# Patient Record
Sex: MALE | Race: WHITE
[De-identification: names, ages, dates, MRNs, and addresses within clinical notes are randomized per-mention and may not be internally consistent; named-entity substitution may affect disease eponyms.]

---

## 2018-09-08 ENCOUNTER — HOSPITAL ENCOUNTER (INPATIENT)
Dept: HOSPITAL 54 - ER | Age: 43
LOS: 11 days | Discharge: NURSING, REHAB OR LONG-TERM CARE | DRG: 710 | End: 2018-09-19
Attending: INTERNAL MEDICINE | Admitting: INTERNAL MEDICINE
Payer: MEDICAID

## 2018-09-08 VITALS — SYSTOLIC BLOOD PRESSURE: 112 MMHG | DIASTOLIC BLOOD PRESSURE: 64 MMHG

## 2018-09-08 VITALS — HEIGHT: 68 IN | BODY MASS INDEX: 27.74 KG/M2 | WEIGHT: 183 LBS

## 2018-09-08 DIAGNOSIS — J93.82: ICD-10-CM
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
## 2018-09-08 DIAGNOSIS — L89.810: ICD-10-CM

## 2018-09-08 DIAGNOSIS — G82.50: ICD-10-CM
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
## 2018-09-08 DIAGNOSIS — J98.11: ICD-10-CM

## 2018-09-08 DIAGNOSIS — T43.595A: ICD-10-CM
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
## 2018-09-08 DIAGNOSIS — F33.2: ICD-10-CM

## 2018-09-08 DIAGNOSIS — D64.9: ICD-10-CM

## 2018-09-08 DIAGNOSIS — F41.9: ICD-10-CM

## 2018-09-08 DIAGNOSIS — L89.153: ICD-10-CM

## 2018-09-08 DIAGNOSIS — J95.851: ICD-10-CM

## 2018-09-08 DIAGNOSIS — A41.59: Primary | ICD-10-CM

## 2018-09-08 DIAGNOSIS — J96.21: ICD-10-CM

## 2018-09-08 DIAGNOSIS — L89.154: ICD-10-CM

## 2018-09-08 DIAGNOSIS — F41.0: ICD-10-CM

## 2018-09-08 DIAGNOSIS — Z93.0: ICD-10-CM

## 2018-09-08 DIAGNOSIS — Z99.11: ICD-10-CM

## 2018-09-08 LAB
APTT PPP: 27 SEC (ref 23–34)
BASE EXCESS BLDA CALC-SCNC: 0.5 MMOL/L
BASOPHILS # BLD AUTO: 0 /CMM (ref 0–0.2)
BASOPHILS NFR BLD AUTO: 0.3 % (ref 0–2)
BUN SERPL-MCNC: 11 MG/DL (ref 7–18)
CALCIUM SERPL-MCNC: 8.3 MG/DL (ref 8.5–10.1)
CHLORIDE SERPL-SCNC: 100 MMOL/L (ref 98–107)
CO2 SERPL-SCNC: 27 MMOL/L (ref 21–32)
CREAT SERPL-MCNC: 0.5 MG/DL (ref 0.6–1.3)
D DIMER PPP FEU-MCNC: 0.97 MG/L(FEU (ref 0.17–0.5)
DO-HGB MFR BLDA: 572 MMHG
EOSINOPHIL NFR BLD AUTO: 0.7 % (ref 0–6)
GLUCOSE SERPL-MCNC: 117 MG/DL (ref 74–106)
HCT VFR BLD AUTO: 38 % (ref 39–51)
HGB BLD-MCNC: 11.9 G/DL (ref 13.5–17.5)
INHALED O2 CONCENTRATION: 100 %
INR PPP: 1.05 (ref 0.87–1.13)
LYMPHOCYTES NFR BLD AUTO: 1.3 /CMM (ref 0.8–4.8)
LYMPHOCYTES NFR BLD AUTO: 10.1 % (ref 20–44)
MCH RBC QN AUTO: 28 PG (ref 26–33)
MCHC RBC AUTO-ENTMCNC: 31 G/DL (ref 31–36)
MCV RBC AUTO: 88 FL (ref 80–96)
MONOCYTES NFR BLD AUTO: 0.6 /CMM (ref 0.1–1.3)
MONOCYTES NFR BLD AUTO: 4.8 % (ref 2–12)
NEUTROPHILS # BLD AUTO: 10.7 /CMM (ref 1.8–8.9)
NEUTROPHILS NFR BLD AUTO: 84.1 % (ref 43–81)
PCO2 TEMP ADJ BLDA: 33.5 MMHG (ref 35–45)
PH TEMP ADJ BLDA: 7.47 [PH] (ref 7.35–7.45)
PLATELET # BLD AUTO: 448 /CMM (ref 150–450)
PO2 TEMP ADJ BLDA: 107.5 MMHG (ref 75–100)
POTASSIUM SERPL-SCNC: 3.9 MMOL/L (ref 3.5–5.1)
RBC # BLD AUTO: 4.33 MIL/UL (ref 4.5–6)
RDW COEFFICIENT OF VARIATION: 14.6 (ref 11.5–15)
SAO2 % BLDA: 97.6 % (ref 92–98.5)
SODIUM SERPL-SCNC: 137 MMOL/L (ref 136–145)
TROPONIN I SERPL-MCNC: < 0.017 NG/ML (ref 0–0.06)
VENTILATION MODE VENT: (no result)
WBC NRBC COR # BLD AUTO: 12.8 K/UL (ref 4.3–11)

## 2018-09-08 PROCEDURE — A6402 STERILE GAUZE <= 16 SQ IN: HCPCS

## 2018-09-08 PROCEDURE — A4606 OXYGEN PROBE USED W OXIMETER: HCPCS

## 2018-09-08 PROCEDURE — A6253 ABSORPT DRG > 48 SQ IN W/O B: HCPCS

## 2018-09-08 PROCEDURE — Z7610: HCPCS

## 2018-09-08 PROCEDURE — A6403 STERILE GAUZE>16 <= 48 SQ IN: HCPCS

## 2018-09-08 PROCEDURE — L8501 TRACHEOSTOMY SPEAKING VALVE: HCPCS

## 2018-09-08 PROCEDURE — A6248 HYDROGEL DRSG GEL FILLER: HCPCS

## 2018-09-08 PROCEDURE — A4623 TRACHEOSTOMY INNER CANNULA: HCPCS

## 2018-09-08 PROCEDURE — A4216 STERILE WATER/SALINE, 10 ML: HCPCS

## 2018-09-08 PROCEDURE — A7526 TRACHEOSTOMY TUBE COLLAR: HCPCS

## 2018-09-08 NOTE — NUR
pt placed on vent via trach with charted settings. airway patent. trach secure via trach 
tie. pt alert. 

ambu bag at bedside. alarms set and audible, disconnect alarms checked plugged into red 
outlet

suctioned a small amount of thin white secretions. pt receiving no breathing tx at this 
time. pt hob at 30 degrees. suctioned oral secretion from pts mouth

pt currently has a cuffless suman 6 trach and has a large air leak. pt does not wish to 
have a cuffed trach at this time. vent resp rate and tidal volume is inaccurate at this 
time. abg done, er md and nurse notified.

-------------------------------------------------------------------------------

Addendum: 09/08/18 at 2345 by SOL THACKER RT

-------------------------------------------------------------------------------

Amended: Links added.

## 2018-09-08 NOTE — NUR
PT TO ER BED 3. BIBRA 39 C/O "LEAKING TRACH AND 02 AT 83%" PER RA MANUALLY 
BAGGING PT, NO SOB NOTED. PT ABLE TO VOICE CONCERNS. RT AT BEDSIDE FOR VENT.

## 2018-09-08 NOTE — NUR
pt received from fire rescue, pt ventilated via ambu bag through suman 6 trach cuffless 
spo2 85%. pt ventilated with ambu bag on 15L spo2 increased to 96%. pt placed on vent per md 
verbal order. pc 14 22/5 100%. pt tolerated settings at this time. abg to follow.

-------------------------------------------------------------------------------

Addendum: 09/08/18 at 2303 by SOL THACKER RT

-------------------------------------------------------------------------------

Amended: Links added.

## 2018-09-09 VITALS — DIASTOLIC BLOOD PRESSURE: 69 MMHG | SYSTOLIC BLOOD PRESSURE: 134 MMHG

## 2018-09-09 VITALS — SYSTOLIC BLOOD PRESSURE: 134 MMHG | DIASTOLIC BLOOD PRESSURE: 69 MMHG

## 2018-09-09 VITALS — SYSTOLIC BLOOD PRESSURE: 98 MMHG | DIASTOLIC BLOOD PRESSURE: 53 MMHG

## 2018-09-09 VITALS — SYSTOLIC BLOOD PRESSURE: 96 MMHG | DIASTOLIC BLOOD PRESSURE: 53 MMHG

## 2018-09-09 VITALS — SYSTOLIC BLOOD PRESSURE: 94 MMHG | DIASTOLIC BLOOD PRESSURE: 54 MMHG

## 2018-09-09 VITALS — DIASTOLIC BLOOD PRESSURE: 60 MMHG | SYSTOLIC BLOOD PRESSURE: 110 MMHG

## 2018-09-09 VITALS — DIASTOLIC BLOOD PRESSURE: 54 MMHG | SYSTOLIC BLOOD PRESSURE: 94 MMHG

## 2018-09-09 VITALS — SYSTOLIC BLOOD PRESSURE: 104 MMHG | DIASTOLIC BLOOD PRESSURE: 69 MMHG

## 2018-09-09 VITALS — DIASTOLIC BLOOD PRESSURE: 55 MMHG | SYSTOLIC BLOOD PRESSURE: 94 MMHG

## 2018-09-09 VITALS — SYSTOLIC BLOOD PRESSURE: 117 MMHG | DIASTOLIC BLOOD PRESSURE: 67 MMHG

## 2018-09-09 VITALS — SYSTOLIC BLOOD PRESSURE: 98 MMHG | DIASTOLIC BLOOD PRESSURE: 52 MMHG

## 2018-09-09 VITALS — DIASTOLIC BLOOD PRESSURE: 66 MMHG | SYSTOLIC BLOOD PRESSURE: 115 MMHG

## 2018-09-09 VITALS — DIASTOLIC BLOOD PRESSURE: 53 MMHG | SYSTOLIC BLOOD PRESSURE: 96 MMHG

## 2018-09-09 VITALS — SYSTOLIC BLOOD PRESSURE: 115 MMHG | DIASTOLIC BLOOD PRESSURE: 66 MMHG

## 2018-09-09 VITALS — DIASTOLIC BLOOD PRESSURE: 57 MMHG | SYSTOLIC BLOOD PRESSURE: 106 MMHG

## 2018-09-09 VITALS — DIASTOLIC BLOOD PRESSURE: 70 MMHG | SYSTOLIC BLOOD PRESSURE: 121 MMHG

## 2018-09-09 VITALS — DIASTOLIC BLOOD PRESSURE: 51 MMHG | SYSTOLIC BLOOD PRESSURE: 96 MMHG

## 2018-09-09 VITALS — SYSTOLIC BLOOD PRESSURE: 103 MMHG | DIASTOLIC BLOOD PRESSURE: 55 MMHG

## 2018-09-09 VITALS — DIASTOLIC BLOOD PRESSURE: 64 MMHG | SYSTOLIC BLOOD PRESSURE: 111 MMHG

## 2018-09-09 VITALS — DIASTOLIC BLOOD PRESSURE: 59 MMHG | SYSTOLIC BLOOD PRESSURE: 106 MMHG

## 2018-09-09 VITALS — SYSTOLIC BLOOD PRESSURE: 121 MMHG | DIASTOLIC BLOOD PRESSURE: 70 MMHG

## 2018-09-09 VITALS — DIASTOLIC BLOOD PRESSURE: 55 MMHG | SYSTOLIC BLOOD PRESSURE: 97 MMHG

## 2018-09-09 VITALS — DIASTOLIC BLOOD PRESSURE: 61 MMHG | SYSTOLIC BLOOD PRESSURE: 97 MMHG

## 2018-09-09 VITALS — DIASTOLIC BLOOD PRESSURE: 62 MMHG | SYSTOLIC BLOOD PRESSURE: 106 MMHG

## 2018-09-09 VITALS — SYSTOLIC BLOOD PRESSURE: 110 MMHG | DIASTOLIC BLOOD PRESSURE: 60 MMHG

## 2018-09-09 VITALS — DIASTOLIC BLOOD PRESSURE: 56 MMHG | SYSTOLIC BLOOD PRESSURE: 93 MMHG

## 2018-09-09 VITALS — DIASTOLIC BLOOD PRESSURE: 68 MMHG | SYSTOLIC BLOOD PRESSURE: 110 MMHG

## 2018-09-09 VITALS — SYSTOLIC BLOOD PRESSURE: 107 MMHG | DIASTOLIC BLOOD PRESSURE: 68 MMHG

## 2018-09-09 VITALS — DIASTOLIC BLOOD PRESSURE: 56 MMHG | SYSTOLIC BLOOD PRESSURE: 101 MMHG

## 2018-09-09 VITALS — SYSTOLIC BLOOD PRESSURE: 125 MMHG | DIASTOLIC BLOOD PRESSURE: 67 MMHG

## 2018-09-09 VITALS — SYSTOLIC BLOOD PRESSURE: 97 MMHG | DIASTOLIC BLOOD PRESSURE: 61 MMHG

## 2018-09-09 VITALS — SYSTOLIC BLOOD PRESSURE: 97 MMHG | DIASTOLIC BLOOD PRESSURE: 52 MMHG

## 2018-09-09 LAB
BASE EXCESS BLDA CALC-SCNC: 1.9 MMOL/L
BASOPHILS # BLD AUTO: 0.1 /CMM (ref 0–0.2)
BASOPHILS NFR BLD AUTO: 0.4 % (ref 0–2)
BUN SERPL-MCNC: 7 MG/DL (ref 7–18)
CALCIUM SERPL-MCNC: 8.5 MG/DL (ref 8.5–10.1)
CHLORIDE SERPL-SCNC: 103 MMOL/L (ref 98–107)
CO2 SERPL-SCNC: 24 MMOL/L (ref 21–32)
CREAT SERPL-MCNC: 0.4 MG/DL (ref 0.6–1.3)
DO-HGB MFR BLDA: 420.5 MMHG
EOSINOPHIL NFR BLD AUTO: 0.3 % (ref 0–6)
GLUCOSE SERPL-MCNC: 130 MG/DL (ref 74–106)
HCT VFR BLD AUTO: 35 % (ref 39–51)
HGB BLD-MCNC: 11.3 G/DL (ref 13.5–17.5)
INHALED O2 CONCENTRATION: 80 %
INTRINSIC PEEP RESPIRATORY: 5 CM H2O
LYMPHOCYTES NFR BLD AUTO: 0.7 /CMM (ref 0.8–4.8)
LYMPHOCYTES NFR BLD AUTO: 4.7 % (ref 20–44)
MCH RBC QN AUTO: 28 PG (ref 26–33)
MCHC RBC AUTO-ENTMCNC: 32 G/DL (ref 31–36)
MCV RBC AUTO: 87 FL (ref 80–96)
MONOCYTES NFR BLD AUTO: 0.9 /CMM (ref 0.1–1.3)
MONOCYTES NFR BLD AUTO: 6.3 % (ref 2–12)
NEUTROPHILS # BLD AUTO: 13.2 /CMM (ref 1.8–8.9)
NEUTROPHILS NFR BLD AUTO: 88.3 % (ref 43–81)
PCO2 TEMP ADJ BLDA: 45.2 MMHG (ref 35–45)
PH TEMP ADJ BLDA: 7.4 [PH] (ref 7.35–7.45)
PLATELET # BLD AUTO: 413 /CMM (ref 150–450)
PO2 TEMP ADJ BLDA: 102.4 MMHG (ref 75–100)
POTASSIUM SERPL-SCNC: 4.1 MMOL/L (ref 3.5–5.1)
RBC # BLD AUTO: 4.06 MIL/UL (ref 4.5–6)
RDW COEFFICIENT OF VARIATION: 14.8 (ref 11.5–15)
SAO2 % BLDA: 96.8 % (ref 92–98.5)
SODIUM SERPL-SCNC: 136 MMOL/L (ref 136–145)
VENTILATION MODE VENT: (no result)
WBC NRBC COR # BLD AUTO: 15 K/UL (ref 4.3–11)

## 2018-09-09 PROCEDURE — 5A1955Z RESPIRATORY VENTILATION, GREATER THAN 96 CONSECUTIVE HOURS: ICD-10-PCS | Performed by: INTERNAL MEDICINE

## 2018-09-09 RX ADMIN — PIPERACILLIN SODIUM AND TAZOBACTAM SODIUM SCH MLS/HR: .5; 4 INJECTION, POWDER, LYOPHILIZED, FOR SOLUTION INTRAVENOUS at 23:01

## 2018-09-09 RX ADMIN — ALBUTEROL SULFATE SCH MG: 2.5 SOLUTION RESPIRATORY (INHALATION) at 19:55

## 2018-09-09 RX ADMIN — QUETIAPINE SCH MG: 25 TABLET, FILM COATED ORAL at 09:09

## 2018-09-09 RX ADMIN — QUETIAPINE SCH MG: 25 TABLET, FILM COATED ORAL at 16:10

## 2018-09-09 RX ADMIN — TRAZODONE HYDROCHLORIDE SCH MG: 50 TABLET ORAL at 22:21

## 2018-09-09 RX ADMIN — TRAMADOL HYDROCHLORIDE PRN MG: 50 TABLET, FILM COATED ORAL at 21:20

## 2018-09-09 RX ADMIN — ALBUTEROL SULFATE SCH MG: 2.5 SOLUTION RESPIRATORY (INHALATION) at 13:01

## 2018-09-09 RX ADMIN — TRAMADOL HYDROCHLORIDE PRN MG: 50 TABLET, FILM COATED ORAL at 16:08

## 2018-09-09 RX ADMIN — DEXTROSE MONOHYDRATE SCH MLS/HR: 50 INJECTION, SOLUTION INTRAVENOUS at 08:18

## 2018-09-09 RX ADMIN — QUETIAPINE PRN MG: 25 TABLET, FILM COATED ORAL at 13:06

## 2018-09-09 RX ADMIN — BUDESONIDE SCH MG: 0.25 SUSPENSION RESPIRATORY (INHALATION) at 09:00

## 2018-09-09 RX ADMIN — PIPERACILLIN SODIUM AND TAZOBACTAM SODIUM SCH MLS/HR: .5; 4 INJECTION, POWDER, LYOPHILIZED, FOR SOLUTION INTRAVENOUS at 17:48

## 2018-09-09 RX ADMIN — ACETYLCYSTEINE SCH MG: 100 INHALANT RESPIRATORY (INHALATION) at 09:00

## 2018-09-09 RX ADMIN — DEXTROSE MONOHYDRATE SCH MLS/HR: 50 INJECTION, SOLUTION INTRAVENOUS at 16:09

## 2018-09-09 RX ADMIN — SODIUM CHLORIDE, SODIUM LACTATE, POTASSIUM CHLORIDE, AND CALCIUM CHLORIDE PRN MLS/HR: .6; .31; .03; .02 INJECTION, SOLUTION INTRAVENOUS at 09:03

## 2018-09-09 RX ADMIN — CLOTRIMAZOLE SCH GM: 1 CREAM TOPICAL at 12:45

## 2018-09-09 RX ADMIN — SODIUM CHLORIDE, SODIUM LACTATE, POTASSIUM CHLORIDE, AND CALCIUM CHLORIDE PRN MLS/HR: .6; .31; .03; .02 INJECTION, SOLUTION INTRAVENOUS at 22:23

## 2018-09-09 RX ADMIN — ACETYLCYSTEINE SCH MG: 100 INHALANT RESPIRATORY (INHALATION) at 21:18

## 2018-09-09 RX ADMIN — OXYCODONE HYDROCHLORIDE AND ACETAMINOPHEN SCH MG: 500 TABLET ORAL at 16:10

## 2018-09-09 RX ADMIN — PIPERACILLIN SODIUM AND TAZOBACTAM SODIUM SCH MLS/HR: .5; 4 INJECTION, POWDER, LYOPHILIZED, FOR SOLUTION INTRAVENOUS at 11:00

## 2018-09-09 RX ADMIN — Medication SCH MG: at 09:09

## 2018-09-09 RX ADMIN — OXYCODONE HYDROCHLORIDE AND ACETAMINOPHEN SCH MG: 500 TABLET ORAL at 09:09

## 2018-09-09 RX ADMIN — LIDOCAINE HYDROCHLORIDE PRN ML: 20 SOLUTION ORAL; TOPICAL at 18:17

## 2018-09-09 RX ADMIN — DEXTROSE MONOHYDRATE SCH MLS/HR: 50 INJECTION, SOLUTION INTRAVENOUS at 23:35

## 2018-09-09 RX ADMIN — BUDESONIDE SCH MG: 0.25 SUSPENSION RESPIRATORY (INHALATION) at 21:18

## 2018-09-09 RX ADMIN — CLOTRIMAZOLE SCH GM: 1 CREAM TOPICAL at 17:00

## 2018-09-09 NOTE — NUR
vent changes made per md verbal order and according to pt comfort

-------------------------------------------------------------------------------

Addendum: 09/09/18 at 4861 by SOL MARROQUIN

-------------------------------------------------------------------------------

Amended: Links added.

## 2018-09-09 NOTE — NUR
ICU RN INITIAL SHIFT NOTES



RECEIVED PATIENT IN BED, AWAKE, ALERT AND ORIENTED X4. TRACH MIDLINE AND INTACT, SHILEY 6 
CUFFLESS, SETTINGS: PC 16, FIO2 @ 60%, PEEP 5. PATIENT TOLERATING SETTINGS FAIRLY, WILL 
MONITOR RESPIRATORY STATUS. ON TELEMETRY MONITORING, READING SR, HR 60s AT THIS TIME. 
C-COLLAR OFF AT THIS TIME, TO BE PLACED BACK ON @ 2100. MASON CATHETER DRAINGING URINE VIA 
GRAVITY. IV SITES PATENT AND INTACT, FLUSHED WITH NS, FREE FROM ANY S/S OF INFILTRATION OR 
PHLEBITIS. PLAN OF CARE DISCUSSED IN DETAIL WITH PATIENT AND FAMILY MEMBERS/VISITORS, WITH 
VERBALIZATION OF UNDERSTANDING. WILL MONITOR CLOSELY

## 2018-09-09 NOTE — NUR
18G IV TO L HAND X 1 ATTEMPT USING ASEPTIC TECH. IV FLUSHES EASILY WITH NS, NO 
S/S INFILTRATION NOTED AT THIS TIME.

## 2018-09-09 NOTE — NUR
VSS. ALL DUE MEDS GIVEN AND ALL NEEDS MET. PATIENT STATES LIDOCAINE SWISH HELPED WITH THROAT 
AND ULTRAM WITH PAIN. IVF RUNNING PER ORDER IV SITES C/D/I/P. PATIENT EATING WITHOUT S/S 
ASPIRATION AND TOLERATING DIET WELL. BREATHING STABLE ON 60% FI02. SAFETY, ASPIRATION, AND 
SKIN PRECAUTIONS IN PLACE. WILL ENDORSE TO RN FOR RENATA. PATIENT STABLE AT THIS TIME.

## 2018-09-09 NOTE — NUR
RECEIVED PATIENT FROM RN GALEN. PATIENT SLEEPING AWAKE TO LIGHT TOUCH. PATIENT DENIES SOB, 
DIFFICULTY BREATHING AT THIS TIME. ARRIVED TO HOSPITAL S/T HYPOXIA % FI02. PATIENT 
CURRENTLY TOLERATING 80% FI02 OXYGENATION STABLE. PATIENT TRACH VENT ON PRESSURE SUPPORT PER 
ORDER. SARAHI #6. A/OX4. TELE NSR. MASON CATH IN PLACE DRAINING TO GRAVITY. PATIENT WITH C 
COLLAR ON WHICH IS TO STAY IN PLACE FOR SUPPORT. IV SITES C/D/I/P. ALL NEEDS ASSESSED AND 
MET. ASPIRATION, SAFETY, AND SKIN PRECAUTIONS IN PLACE. WILL MONITOR

## 2018-09-09 NOTE — NUR
PER PATIENT HE TAKES SEROQUEL AT HOME PRN AS WELL AS LEXUS. NOTIFIED MD SALEH. PER MD ORDER 
12.5 MG SEROQUEL PRN Q8H

## 2018-09-09 NOTE — NUR
PER PATIENT THROAT SPRAY NOT WORKING. OK PER MD TO ORDER VISCOUS LIDOCAINE 15ML Q8H PRN FOR 
SORE THROAT

## 2018-09-09 NOTE — NUR
RECEIVED PT TRACHED, CUFFLESS ON VENT. PT IS AWAKE AND ALERT NO SOB. PT IS ON PC MODE. 
ALARMS SET AND AUDIBLE. AMBU BAG AT BEDSIDE. VENT PLUGGED INTO RED OUTLET. WILL CONTINUE TO 
MONITOR.

-------------------------------------------------------------------------------

Addendum: 09/09/18 at 2018 by DEBORAH LERMA RT

-------------------------------------------------------------------------------

Amended: Links added.

## 2018-09-09 NOTE — NUR
ICU RN CLOSING NOTES



PATIENT RESTING IN BED, APPEARS COMFORTABLE, DENIES ANY PAIN OR DISCOMFORT. REMAINS ON 
MECHANICAL VENTILATOR. WILL ENDORSE THE PATIENT TO THE AM SHIFT NURSE FOR CONTINUITY OF CARE

## 2018-09-09 NOTE — NUR
DR SALEH AT BEDSIDE. PER MD PERKINS ORDER CHLORASEPTIC SPRAY FOR PATIENT SORE THROAT. MD AWARE OF 
MED RECON. PATIENT STABLE AT THIS TIME

## 2018-09-09 NOTE — NUR
ICU RN ADMISSION NOTES



RECEIVED PATIENT FROM ER VIA [facility]. PATIENT TRANSFERRED TO HOSPITAL BED IN ICU ROOM 256. 
PATIENT TOLERATED TRANSFER WELL. PATIENT'S FATHER AT BEDSIDE UPON ADMISSION. PLAN OF CARE 
DISCUSSED WITH THE PATIENT AND HIS FATHER, BOTH VERBALIZING UNDERSTANDING OF THE PLAN AND IN 
AGREEMENT. PATIENT IS ALERT AND ORIENTED X4, ABLE TO VERBALIZE NEEDS, BUT DUE TO RECENT 
SWIMMING ACCIDENT, IS NOW QUADRIPLEGIC. NOTED WITH SPINAL COLLAR, ADJUSTED FOR COMFORT. 
SARAHI STEIN 6, CUFFLESS. EXPLAINED TO THE PATIENT AND PATIENT'S FATHER REGARDING THE NEED 
FOR A TRACH WITH CUFF FOR SAFETY/AIRWAY PROTECTION. PATIENT AND PATIENT'S FATHER BOTH 
VERBALIZE UNDERSTANDING, BUT STILL STRONGLY REFUSE BECAUSE THE PATIENT "WILL NO LONG BE ABLE 
TO EAT OR TALK." PATIENT REMAINS ON MECHANICAL VENTILATOR, PRESSURE CONTROL OF 16, FIO2 @ 
100% WITH PEEP 5. PATIENT AND FAMILY INSTRUCTED TO WAIT FOR AND SPEAK TO THE PULMONOLOGIST 
TO DISCUSS TREATMENT OPTIONS, BOTH PATIENT AND FATHER IN AGREEMENT OF THIS PLAN. LEFT HAND 

#18 AND RIGHT HAND #18 PERIPHERAL IVs PATENT AND INTACT, BOTH FLUSHED WITH NS, FREE FROM ANY 
S/S OF INFILTRATION OR PHLEBITIS. MASON CATHETER PATENT AND INTACT, DRAINING CLEAR YELLOW 
URINE TO GRAVITY. BED IN LOWEST AND LOCKED POSITION. WILL CONTINUE TO CLOSELY MONITOR

## 2018-09-09 NOTE — NUR
DR FONTAINE AT BEDSIDE. PER MD OK TITRATE FI02 AND REDUCE TO 60%. PER MD NO NEED FOR CHANGING 
OF TRACH. OK AS IS. NO S/S LEAKING AT THIS TIME

## 2018-09-10 VITALS — DIASTOLIC BLOOD PRESSURE: 56 MMHG | SYSTOLIC BLOOD PRESSURE: 88 MMHG

## 2018-09-10 VITALS — SYSTOLIC BLOOD PRESSURE: 92 MMHG | DIASTOLIC BLOOD PRESSURE: 55 MMHG

## 2018-09-10 VITALS — SYSTOLIC BLOOD PRESSURE: 107 MMHG | DIASTOLIC BLOOD PRESSURE: 64 MMHG

## 2018-09-10 VITALS — SYSTOLIC BLOOD PRESSURE: 119 MMHG | DIASTOLIC BLOOD PRESSURE: 75 MMHG

## 2018-09-10 VITALS — DIASTOLIC BLOOD PRESSURE: 55 MMHG | SYSTOLIC BLOOD PRESSURE: 95 MMHG

## 2018-09-10 VITALS — DIASTOLIC BLOOD PRESSURE: 62 MMHG | SYSTOLIC BLOOD PRESSURE: 102 MMHG

## 2018-09-10 VITALS — SYSTOLIC BLOOD PRESSURE: 97 MMHG | DIASTOLIC BLOOD PRESSURE: 51 MMHG

## 2018-09-10 VITALS — SYSTOLIC BLOOD PRESSURE: 98 MMHG | DIASTOLIC BLOOD PRESSURE: 60 MMHG

## 2018-09-10 VITALS — SYSTOLIC BLOOD PRESSURE: 92 MMHG | DIASTOLIC BLOOD PRESSURE: 49 MMHG

## 2018-09-10 VITALS — SYSTOLIC BLOOD PRESSURE: 98 MMHG | DIASTOLIC BLOOD PRESSURE: 57 MMHG

## 2018-09-10 VITALS — SYSTOLIC BLOOD PRESSURE: 106 MMHG | DIASTOLIC BLOOD PRESSURE: 64 MMHG

## 2018-09-10 VITALS — SYSTOLIC BLOOD PRESSURE: 114 MMHG | DIASTOLIC BLOOD PRESSURE: 65 MMHG

## 2018-09-10 VITALS — DIASTOLIC BLOOD PRESSURE: 51 MMHG | SYSTOLIC BLOOD PRESSURE: 97 MMHG

## 2018-09-10 VITALS — DIASTOLIC BLOOD PRESSURE: 61 MMHG | SYSTOLIC BLOOD PRESSURE: 105 MMHG

## 2018-09-10 VITALS — DIASTOLIC BLOOD PRESSURE: 64 MMHG | SYSTOLIC BLOOD PRESSURE: 109 MMHG

## 2018-09-10 VITALS — DIASTOLIC BLOOD PRESSURE: 66 MMHG | SYSTOLIC BLOOD PRESSURE: 109 MMHG

## 2018-09-10 VITALS — DIASTOLIC BLOOD PRESSURE: 60 MMHG | SYSTOLIC BLOOD PRESSURE: 98 MMHG

## 2018-09-10 VITALS — SYSTOLIC BLOOD PRESSURE: 101 MMHG | DIASTOLIC BLOOD PRESSURE: 55 MMHG

## 2018-09-10 VITALS — SYSTOLIC BLOOD PRESSURE: 109 MMHG | DIASTOLIC BLOOD PRESSURE: 64 MMHG

## 2018-09-10 VITALS — DIASTOLIC BLOOD PRESSURE: 76 MMHG | SYSTOLIC BLOOD PRESSURE: 129 MMHG

## 2018-09-10 VITALS — DIASTOLIC BLOOD PRESSURE: 59 MMHG | SYSTOLIC BLOOD PRESSURE: 102 MMHG

## 2018-09-10 VITALS — DIASTOLIC BLOOD PRESSURE: 55 MMHG | SYSTOLIC BLOOD PRESSURE: 107 MMHG

## 2018-09-10 VITALS — DIASTOLIC BLOOD PRESSURE: 62 MMHG | SYSTOLIC BLOOD PRESSURE: 104 MMHG

## 2018-09-10 VITALS — DIASTOLIC BLOOD PRESSURE: 62 MMHG | SYSTOLIC BLOOD PRESSURE: 106 MMHG

## 2018-09-10 VITALS — DIASTOLIC BLOOD PRESSURE: 55 MMHG | SYSTOLIC BLOOD PRESSURE: 101 MMHG

## 2018-09-10 VITALS — SYSTOLIC BLOOD PRESSURE: 105 MMHG | DIASTOLIC BLOOD PRESSURE: 62 MMHG

## 2018-09-10 VITALS — SYSTOLIC BLOOD PRESSURE: 103 MMHG | DIASTOLIC BLOOD PRESSURE: 58 MMHG

## 2018-09-10 VITALS — SYSTOLIC BLOOD PRESSURE: 95 MMHG | DIASTOLIC BLOOD PRESSURE: 56 MMHG

## 2018-09-10 VITALS — SYSTOLIC BLOOD PRESSURE: 105 MMHG | DIASTOLIC BLOOD PRESSURE: 56 MMHG

## 2018-09-10 LAB
BASE EXCESS BLDA CALC-SCNC: 2.2 MMOL/L
BASE EXCESS BLDA CALC-SCNC: 2.5 MMOL/L
BASOPHILS # BLD AUTO: 0 /CMM (ref 0–0.2)
BASOPHILS NFR BLD AUTO: 0.1 % (ref 0–2)
BUN SERPL-MCNC: 4 MG/DL (ref 7–18)
CALCIUM SERPL-MCNC: 8.2 MG/DL (ref 8.5–10.1)
CHLORIDE SERPL-SCNC: 100 MMOL/L (ref 98–107)
CO2 SERPL-SCNC: 27 MMOL/L (ref 21–32)
CREAT SERPL-MCNC: 0.2 MG/DL (ref 0.6–1.3)
DO-HGB MFR BLDA: 416.3 MMHG
DO-HGB MFR BLDA: 600 MMHG
EOSINOPHIL NFR BLD AUTO: 0.8 % (ref 0–6)
GLUCOSE SERPL-MCNC: 134 MG/DL (ref 74–106)
HCT VFR BLD AUTO: 35 % (ref 39–51)
HGB BLD-MCNC: 10.7 G/DL (ref 13.5–17.5)
INHALED O2 CONCENTRATION: 100 %
INHALED O2 CONCENTRATION: 80 %
INTRINSIC PEEP RESPIRATORY: 0 CM H2O
LYMPHOCYTES NFR BLD AUTO: 0.5 /CMM (ref 0.8–4.8)
LYMPHOCYTES NFR BLD AUTO: 2.7 % (ref 20–44)
MCH RBC QN AUTO: 27 PG (ref 26–33)
MCHC RBC AUTO-ENTMCNC: 31 G/DL (ref 31–36)
MCV RBC AUTO: 88 FL (ref 80–96)
MONOCYTES NFR BLD AUTO: 1.1 /CMM (ref 0.1–1.3)
MONOCYTES NFR BLD AUTO: 5.8 % (ref 2–12)
NEUTROPHILS # BLD AUTO: 16.9 /CMM (ref 1.8–8.9)
NEUTROPHILS NFR BLD AUTO: 90.6 % (ref 43–81)
PCO2 TEMP ADJ BLDA: 53.7 MMHG (ref 35–45)
PCO2 TEMP ADJ BLDA: 58.3 MMHG (ref 35–45)
PEEP SETTING VENT: 500 ML
PEEP SETTING VENT: 500 ML
PH TEMP ADJ BLDA: 7.32 [PH] (ref 7.35–7.45)
PH TEMP ADJ BLDA: 7.35 [PH] (ref 7.35–7.45)
PLATELET # BLD AUTO: 347 /CMM (ref 150–450)
PO2 TEMP ADJ BLDA: 59.3 MMHG (ref 75–100)
PO2 TEMP ADJ BLDA: 92.9 MMHG (ref 75–100)
POTASSIUM SERPL-SCNC: 4.1 MMOL/L (ref 3.5–5.1)
RBC # BLD AUTO: 3.97 MIL/UL (ref 4.5–6)
RDW COEFFICIENT OF VARIATION: 14.4 (ref 11.5–15)
SAO2 % BLDA: 89.4 % (ref 92–98.5)
SAO2 % BLDA: 96 % (ref 92–98.5)
SET RATE, BG: 14
SET RATE, BG: 16
SODIUM SERPL-SCNC: 132 MMOL/L (ref 136–145)
VENTILATION MODE VENT: (no result)
WBC NRBC COR # BLD AUTO: 18.7 K/UL (ref 4.3–11)

## 2018-09-10 RX ADMIN — OXYCODONE HYDROCHLORIDE AND ACETAMINOPHEN SCH MG: 500 TABLET ORAL at 17:09

## 2018-09-10 RX ADMIN — QUETIAPINE PRN MG: 25 TABLET, FILM COATED ORAL at 21:13

## 2018-09-10 RX ADMIN — TRAMADOL HYDROCHLORIDE PRN MG: 50 TABLET, FILM COATED ORAL at 19:08

## 2018-09-10 RX ADMIN — TRAZODONE HYDROCHLORIDE SCH MG: 50 TABLET ORAL at 21:13

## 2018-09-10 RX ADMIN — SODIUM CHLORIDE, SODIUM LACTATE, POTASSIUM CHLORIDE, AND CALCIUM CHLORIDE PRN MLS/HR: .6; .31; .03; .02 INJECTION, SOLUTION INTRAVENOUS at 12:11

## 2018-09-10 RX ADMIN — PIPERACILLIN SODIUM AND TAZOBACTAM SODIUM SCH MLS/HR: .5; 4 INJECTION, POWDER, LYOPHILIZED, FOR SOLUTION INTRAVENOUS at 17:09

## 2018-09-10 RX ADMIN — QUETIAPINE PRN MG: 25 TABLET, FILM COATED ORAL at 11:19

## 2018-09-10 RX ADMIN — PIPERACILLIN SODIUM AND TAZOBACTAM SODIUM SCH MLS/HR: .5; 4 INJECTION, POWDER, LYOPHILIZED, FOR SOLUTION INTRAVENOUS at 11:19

## 2018-09-10 RX ADMIN — ACETYLCYSTEINE SCH MG: 100 INHALANT RESPIRATORY (INHALATION) at 07:35

## 2018-09-10 RX ADMIN — PIPERACILLIN SODIUM AND TAZOBACTAM SODIUM SCH MLS/HR: .5; 4 INJECTION, POWDER, LYOPHILIZED, FOR SOLUTION INTRAVENOUS at 06:46

## 2018-09-10 RX ADMIN — ALBUTEROL SULFATE SCH MG: 2.5 SOLUTION RESPIRATORY (INHALATION) at 01:07

## 2018-09-10 RX ADMIN — BUDESONIDE SCH MG: 0.25 SUSPENSION RESPIRATORY (INHALATION) at 21:44

## 2018-09-10 RX ADMIN — ALBUTEROL SULFATE SCH MG: 2.5 SOLUTION RESPIRATORY (INHALATION) at 07:35

## 2018-09-10 RX ADMIN — LIDOCAINE HYDROCHLORIDE PRN ML: 20 SOLUTION ORAL; TOPICAL at 12:26

## 2018-09-10 RX ADMIN — Medication SCH EACH: at 17:09

## 2018-09-10 RX ADMIN — TRAMADOL HYDROCHLORIDE PRN MG: 50 TABLET, FILM COATED ORAL at 07:36

## 2018-09-10 RX ADMIN — DEXTROSE MONOHYDRATE SCH MLS/HR: 50 INJECTION, SOLUTION INTRAVENOUS at 08:47

## 2018-09-10 RX ADMIN — ALBUTEROL SULFATE SCH MG: 2.5 SOLUTION RESPIRATORY (INHALATION) at 20:00

## 2018-09-10 RX ADMIN — CLOTRIMAZOLE SCH GM: 1 CREAM TOPICAL at 09:00

## 2018-09-10 RX ADMIN — BUDESONIDE SCH MG: 0.25 SUSPENSION RESPIRATORY (INHALATION) at 09:00

## 2018-09-10 RX ADMIN — OXYCODONE HYDROCHLORIDE AND ACETAMINOPHEN SCH MG: 500 TABLET ORAL at 08:01

## 2018-09-10 RX ADMIN — Medication SCH APPLIC: at 11:19

## 2018-09-10 RX ADMIN — TRAMADOL HYDROCHLORIDE PRN MG: 50 TABLET, FILM COATED ORAL at 02:07

## 2018-09-10 RX ADMIN — QUETIAPINE SCH MG: 25 TABLET, FILM COATED ORAL at 17:09

## 2018-09-10 RX ADMIN — ACETYLCYSTEINE SCH MG: 100 INHALANT RESPIRATORY (INHALATION) at 21:44

## 2018-09-10 RX ADMIN — QUETIAPINE SCH MG: 25 TABLET, FILM COATED ORAL at 08:01

## 2018-09-10 RX ADMIN — TRAMADOL HYDROCHLORIDE PRN MG: 50 TABLET, FILM COATED ORAL at 22:48

## 2018-09-10 RX ADMIN — Medication SCH MG: at 08:01

## 2018-09-10 RX ADMIN — LIDOCAINE HYDROCHLORIDE PRN ML: 20 SOLUTION ORAL; TOPICAL at 02:07

## 2018-09-10 RX ADMIN — DEXTROSE MONOHYDRATE SCH MLS/HR: 50 INJECTION, SOLUTION INTRAVENOUS at 07:31

## 2018-09-10 RX ADMIN — DEXTROSE MONOHYDRATE SCH MLS/HR: 50 INJECTION, SOLUTION INTRAVENOUS at 15:22

## 2018-09-10 RX ADMIN — ALBUTEROL SULFATE SCH MG: 2.5 SOLUTION RESPIRATORY (INHALATION) at 13:32

## 2018-09-10 NOTE — NUR
PER DR FONTAINE OK TO CHANGE PATIENT VENT SETTINGS TO AC 14, , FI02 100% AND TITRATE FOR 
O2 SAT 94% OR GREATER. NO PEEP.AND ABG 2HRS POST CHANGES. PATIENT TOLERATING WELL AT THIS 
TIME. RT AT BEDSIDE. RR STABLE

## 2018-09-10 NOTE — NUR
pt received on vent via trach with charted settings. airway patent. secure via trach tie.

ambu bag at bedside. alarms set and audible, disconnect alarms checked plugged into red 
outlet.

suctioned a small amount of thin white secretions. pt receiving breathing tx q6. pt hob at 
30 degrees. suctioned oral secretion from pts mouth

-------------------------------------------------------------------------------

Addendum: 09/10/18 at 2007 by SOL MARROQUIN

-------------------------------------------------------------------------------

Amended: Links added.

## 2018-09-10 NOTE — NUR
PATIENT O2 SAT STABLE ON AC SETTINGS. PATIENT NEEDS ASSESSED AND MET. SAFETY, SKIN, 
ASPIRATION PRECAUTIONS MONITORED THROUGHOUT DAY. PATIENT IV SITES C/D/I/P. IVF RUNNING PER 
ORDER. MASON IN PLACE DRAINING TO GRAVITY. PATIENT CARE ENDORSED TO JOVON LIGHT FOR RENATA

## 2018-09-10 NOTE — NUR
DR SALEH AT BEDSIDE. UPDATED ON PATIENT INCREASED FI02 NEEDS. LEAKING AROUND TRACH PER MD 
CONTINUE IN ICU AND PENDING PULMONARY TODAY FOR ORDERS. MD AWARE OF PATIENT TEMPERATURE 94.2 
NO NEW ORDERS. MD AWARE OF PATIENT BRADYCARDIA AT TIMES TO HIGH 40'S NO NEW ORDERS. PT IV 
LEFT FA LEAKING. REMOVED AND CATH TIP INTACT. PRESSURE AND DRESSING APPLIED NO BLEEDING 
NOTED.

## 2018-09-10 NOTE — NUR
ICU RN - PT. IS DESATTING. RT AT BEDSIDE, THEY TURNED FIO2 UP %. PT. SLOWLY CAME UP 
FROM 80'S TO LOW 90'S. NO S/S OF DISTRESS/DISCOMFORT. CONT. POC.

## 2018-09-10 NOTE — NUR
NOTIFIED RT PATIENT FI02 NEEDS INCREASED OVERNIGHT AND PATIENT TACHYPNEIC. PER VENT PATIENT 
IS NOT GETTING VOLUME BECAUSE CONTINUES TO OPEN MOUTH AND AIR ESCAPES. EDUCATED PATIENT ON 
CUFFED TRACH AND RT AT BEDSIDE FOR EDUCATION AND ASSISTANCE

## 2018-09-10 NOTE — NUR
PT. 43 Y OLD REC. AWAKE AND ALERT 0700 AM TRACHED CUFFLESS SHILEY # 6 , ON MECHANICAL VENT ( 
PC MODE), @ 1240 PM 

X2 RT AT THE BEDSIDE. AND TRACH CHANGED ( MLT, B/S EQUAL, OBTURATOR REMAIN @ BEDSIDE.  ).

PER DR. FONTAINE ORDER AND NO DISTRESS NOTED, NO BLEEDING, GILLES. WELL AND 

VENT SETTING CHANGED PER DR. FONTAINE ORDER POST ABG VENT CHANGES DONE AGAIN PER DR. FONTAINE ( JOVON NARVAEZ NOTIFIED ALL THE CHANGES ) T/O  DAY FIO2 DECREASED FROM 100% TO 45 % AT THE END OF 
THE SHIFT. FAMILY MEMBERS AND FRIENDS AT THE BEDSIDE. AND PT. REMAIN STABLE. PT CONTINUE 
CARE AND MONITORING WAS CLOSELY DONE T/O SHIFT. TX'S GIVEN INLINE B/S REMAIN CLEAR AND FINE 
RALES BILATERALLY EQUAL CHEST RISE NOTED AND VOLUMES ON VENT REMAIN ADEQUATE, AND ONE MED 
WAS NOT GIVEN DUE TO REFUSAL. AMBU BAG REMAIN AT THE BEDSIDE. EDUCATION AND PT. WAS INFORMED 
FOR ALL THE CARE T/O DAY. 

-------------------------------------------------------------------------------

Addendum: 09/11/18 at 0734 by ROMÁN GRIMES RT

-------------------------------------------------------------------------------

Amended: Links added.

## 2018-09-10 NOTE — NUR
NOTIFIED DR FONTAINE PATIENT ABG. PER MD CHANGE AC TO 16 AND FI02 TO 60% NOTIFIED RT. PATIENT 
CONTINUES TO HAVE SEVERE ANXIETY PER MD ORDER ZYPREZA 5MG PO Q12H PRN. ALL NEEDS MET AND 
ASSESSED

## 2018-09-10 NOTE — NUR
PER PATIENT HE DOES NOT USE LOTRIMIN ANYMORE. HE USED TO USE IT ON THE FEET. PATIENT 
TOLERATED TRACH CHANGE WELL. NO COMPLICATIONS NOTED. TOLERATING WELL AT THIS TIME. WILL 
CONTINUE TO MONITOR

## 2018-09-10 NOTE — NUR
DR FONTAINE AT BEDSIDE. MD AWARE PATIENT TACHYPNEIC AND VOLUMES NOT BEING DELIVERED TO PATIENT/ 
TRACH LEAKING/AIR LEAKING. PER MD PATIENT IS NEEDING A CUFFED TRACH TO DELIVER OXYGEN TO 
LUNGS. PER MD TITRATE FI02 FOR OXYGEN SAT 94% AND ABOVE. PATIENT IS AGREEABLE TO TRACH 
CHANGE.

## 2018-09-10 NOTE — NUR
TRACHED CHANGED PER  TO SARAHI # 6 CUFFED X2 RT'S AT THE BEDSIDE. NO BLEEDING NOTED 
GOOD VOLUMES AND VENT CHANGES PER DR. FONTAINE PLACED ON AC 14, 500,100% BLOOD GAS ORDERED. 
AMBU BAG REMAIN AT THE BEDSIDE.

-------------------------------------------------------------------------------

Addendum: 09/10/18 at 1829 by ROMÁN GRIMES RT

-------------------------------------------------------------------------------

Amended: Links added.

## 2018-09-11 VITALS — DIASTOLIC BLOOD PRESSURE: 63 MMHG | SYSTOLIC BLOOD PRESSURE: 113 MMHG

## 2018-09-11 VITALS — DIASTOLIC BLOOD PRESSURE: 56 MMHG | SYSTOLIC BLOOD PRESSURE: 102 MMHG

## 2018-09-11 VITALS — SYSTOLIC BLOOD PRESSURE: 101 MMHG | DIASTOLIC BLOOD PRESSURE: 52 MMHG

## 2018-09-11 VITALS — SYSTOLIC BLOOD PRESSURE: 106 MMHG | DIASTOLIC BLOOD PRESSURE: 46 MMHG

## 2018-09-11 VITALS — DIASTOLIC BLOOD PRESSURE: 64 MMHG | SYSTOLIC BLOOD PRESSURE: 113 MMHG

## 2018-09-11 VITALS — DIASTOLIC BLOOD PRESSURE: 50 MMHG | SYSTOLIC BLOOD PRESSURE: 101 MMHG

## 2018-09-11 VITALS — DIASTOLIC BLOOD PRESSURE: 46 MMHG | SYSTOLIC BLOOD PRESSURE: 106 MMHG

## 2018-09-11 VITALS — DIASTOLIC BLOOD PRESSURE: 58 MMHG | SYSTOLIC BLOOD PRESSURE: 105 MMHG

## 2018-09-11 VITALS — SYSTOLIC BLOOD PRESSURE: 106 MMHG | DIASTOLIC BLOOD PRESSURE: 64 MMHG

## 2018-09-11 VITALS — SYSTOLIC BLOOD PRESSURE: 105 MMHG | DIASTOLIC BLOOD PRESSURE: 58 MMHG

## 2018-09-11 VITALS — DIASTOLIC BLOOD PRESSURE: 61 MMHG | SYSTOLIC BLOOD PRESSURE: 104 MMHG

## 2018-09-11 VITALS — SYSTOLIC BLOOD PRESSURE: 107 MMHG | DIASTOLIC BLOOD PRESSURE: 57 MMHG

## 2018-09-11 VITALS — DIASTOLIC BLOOD PRESSURE: 53 MMHG | SYSTOLIC BLOOD PRESSURE: 100 MMHG

## 2018-09-11 VITALS — SYSTOLIC BLOOD PRESSURE: 93 MMHG | DIASTOLIC BLOOD PRESSURE: 69 MMHG

## 2018-09-11 VITALS — SYSTOLIC BLOOD PRESSURE: 104 MMHG | DIASTOLIC BLOOD PRESSURE: 65 MMHG

## 2018-09-11 VITALS — SYSTOLIC BLOOD PRESSURE: 100 MMHG | DIASTOLIC BLOOD PRESSURE: 50 MMHG

## 2018-09-11 VITALS — DIASTOLIC BLOOD PRESSURE: 66 MMHG | SYSTOLIC BLOOD PRESSURE: 116 MMHG

## 2018-09-11 VITALS — DIASTOLIC BLOOD PRESSURE: 59 MMHG | SYSTOLIC BLOOD PRESSURE: 110 MMHG

## 2018-09-11 VITALS — DIASTOLIC BLOOD PRESSURE: 65 MMHG | SYSTOLIC BLOOD PRESSURE: 117 MMHG

## 2018-09-11 VITALS — SYSTOLIC BLOOD PRESSURE: 100 MMHG | DIASTOLIC BLOOD PRESSURE: 58 MMHG

## 2018-09-11 VITALS — SYSTOLIC BLOOD PRESSURE: 107 MMHG | DIASTOLIC BLOOD PRESSURE: 67 MMHG

## 2018-09-11 VITALS — DIASTOLIC BLOOD PRESSURE: 49 MMHG | SYSTOLIC BLOOD PRESSURE: 93 MMHG

## 2018-09-11 VITALS — SYSTOLIC BLOOD PRESSURE: 101 MMHG | DIASTOLIC BLOOD PRESSURE: 50 MMHG

## 2018-09-11 VITALS — DIASTOLIC BLOOD PRESSURE: 59 MMHG | SYSTOLIC BLOOD PRESSURE: 113 MMHG

## 2018-09-11 VITALS — DIASTOLIC BLOOD PRESSURE: 62 MMHG | SYSTOLIC BLOOD PRESSURE: 105 MMHG

## 2018-09-11 VITALS — DIASTOLIC BLOOD PRESSURE: 63 MMHG | SYSTOLIC BLOOD PRESSURE: 107 MMHG

## 2018-09-11 VITALS — DIASTOLIC BLOOD PRESSURE: 67 MMHG | SYSTOLIC BLOOD PRESSURE: 112 MMHG

## 2018-09-11 VITALS — SYSTOLIC BLOOD PRESSURE: 112 MMHG | DIASTOLIC BLOOD PRESSURE: 67 MMHG

## 2018-09-11 VITALS — SYSTOLIC BLOOD PRESSURE: 104 MMHG | DIASTOLIC BLOOD PRESSURE: 61 MMHG

## 2018-09-11 VITALS — SYSTOLIC BLOOD PRESSURE: 118 MMHG | DIASTOLIC BLOOD PRESSURE: 60 MMHG

## 2018-09-11 VITALS — SYSTOLIC BLOOD PRESSURE: 102 MMHG | DIASTOLIC BLOOD PRESSURE: 56 MMHG

## 2018-09-11 LAB
BASE EXCESS BLDA CALC-SCNC: 2.7 MMOL/L
BASOPHILS # BLD AUTO: 0 /CMM (ref 0–0.2)
BASOPHILS NFR BLD AUTO: 0 % (ref 0–2)
BUN SERPL-MCNC: 2 MG/DL (ref 7–18)
CALCIUM SERPL-MCNC: 8.6 MG/DL (ref 8.5–10.1)
CHLORIDE SERPL-SCNC: 98 MMOL/L (ref 98–107)
CO2 SERPL-SCNC: 28 MMOL/L (ref 21–32)
CREAT SERPL-MCNC: 0.3 MG/DL (ref 0.6–1.3)
DO-HGB MFR BLDA: 576.9 MMHG
EOSINOPHIL NFR BLD AUTO: 0 % (ref 0–6)
GLUCOSE SERPL-MCNC: 174 MG/DL (ref 74–106)
HCT VFR BLD AUTO: 34 % (ref 39–51)
HGB BLD-MCNC: 11.1 G/DL (ref 13.5–17.5)
INHALED O2 CONCENTRATION: 100 %
INTRINSIC PEEP RESPIRATORY: 5 CM H2O
LYMPHOCYTES NFR BLD AUTO: 0.3 /CMM (ref 0.8–4.8)
LYMPHOCYTES NFR BLD AUTO: 1.6 % (ref 20–44)
MCH RBC QN AUTO: 29 PG (ref 26–33)
MCHC RBC AUTO-ENTMCNC: 33 G/DL (ref 31–36)
MCV RBC AUTO: 87 FL (ref 80–96)
MONOCYTES NFR BLD AUTO: 0.8 /CMM (ref 0.1–1.3)
MONOCYTES NFR BLD AUTO: 3.6 % (ref 2–12)
NEUTROPHILS # BLD AUTO: 19.7 /CMM (ref 1.8–8.9)
NEUTROPHILS NFR BLD AUTO: 94.8 % (ref 43–81)
PCO2 TEMP ADJ BLDA: 52.9 MMHG (ref 35–45)
PEEP SETTING VENT: 500 ML
PH TEMP ADJ BLDA: 7.36 [PH] (ref 7.35–7.45)
PLATELET # BLD AUTO: 316 /CMM (ref 150–450)
PO2 TEMP ADJ BLDA: 83.2 MMHG (ref 75–100)
POTASSIUM SERPL-SCNC: 4.2 MMOL/L (ref 3.5–5.1)
RBC # BLD AUTO: 3.89 MIL/UL (ref 4.5–6)
RDW COEFFICIENT OF VARIATION: 13.8 (ref 11.5–15)
SAO2 % BLDA: 94.8 % (ref 92–98.5)
SET RATE, BG: 16
SODIUM SERPL-SCNC: 133 MMOL/L (ref 136–145)
WBC NRBC COR # BLD AUTO: 20.8 K/UL (ref 4.3–11)

## 2018-09-11 RX ADMIN — ACETYLCYSTEINE SCH MG: 100 INHALANT RESPIRATORY (INHALATION) at 08:01

## 2018-09-11 RX ADMIN — ACETAMINOPHEN PRN MG: 160 SOLUTION ORAL at 08:13

## 2018-09-11 RX ADMIN — ACETYLCYSTEINE SCH MG: 100 INHALANT RESPIRATORY (INHALATION) at 21:26

## 2018-09-11 RX ADMIN — SODIUM CHLORIDE, SODIUM LACTATE, POTASSIUM CHLORIDE, AND CALCIUM CHLORIDE PRN MLS/HR: .6; .31; .03; .02 INJECTION, SOLUTION INTRAVENOUS at 17:08

## 2018-09-11 RX ADMIN — BUDESONIDE SCH MG: 0.25 SUSPENSION RESPIRATORY (INHALATION) at 21:26

## 2018-09-11 RX ADMIN — Medication SCH APPLIC: at 14:50

## 2018-09-11 RX ADMIN — DEXTROSE MONOHYDRATE SCH MLS/HR: 50 INJECTION, SOLUTION INTRAVENOUS at 16:07

## 2018-09-11 RX ADMIN — Medication SCH MG: at 08:04

## 2018-09-11 RX ADMIN — PIPERACILLIN SODIUM AND TAZOBACTAM SODIUM SCH MLS/HR: .5; 4 INJECTION, POWDER, LYOPHILIZED, FOR SOLUTION INTRAVENOUS at 17:08

## 2018-09-11 RX ADMIN — ALBUTEROL SULFATE SCH MG: 2.5 SOLUTION RESPIRATORY (INHALATION) at 20:05

## 2018-09-11 RX ADMIN — ACETAMINOPHEN PRN MG: 160 SOLUTION ORAL at 17:08

## 2018-09-11 RX ADMIN — ALBUTEROL SULFATE SCH MG: 2.5 SOLUTION RESPIRATORY (INHALATION) at 07:37

## 2018-09-11 RX ADMIN — QUETIAPINE SCH MG: 25 TABLET, FILM COATED ORAL at 08:04

## 2018-09-11 RX ADMIN — ALBUTEROL SULFATE SCH MG: 2.5 SOLUTION RESPIRATORY (INHALATION) at 02:10

## 2018-09-11 RX ADMIN — ALBUTEROL SULFATE SCH MG: 2.5 SOLUTION RESPIRATORY (INHALATION) at 13:07

## 2018-09-11 RX ADMIN — TRAZODONE HYDROCHLORIDE SCH MG: 50 TABLET ORAL at 21:41

## 2018-09-11 RX ADMIN — DEXTROSE MONOHYDRATE SCH MLS/HR: 50 INJECTION, SOLUTION INTRAVENOUS at 00:07

## 2018-09-11 RX ADMIN — OXYCODONE HYDROCHLORIDE AND ACETAMINOPHEN SCH MG: 500 TABLET ORAL at 08:04

## 2018-09-11 RX ADMIN — Medication SCH EACH: at 17:08

## 2018-09-11 RX ADMIN — PIPERACILLIN SODIUM AND TAZOBACTAM SODIUM SCH MLS/HR: .5; 4 INJECTION, POWDER, LYOPHILIZED, FOR SOLUTION INTRAVENOUS at 18:55

## 2018-09-11 RX ADMIN — TRAMADOL HYDROCHLORIDE PRN MG: 50 TABLET, FILM COATED ORAL at 08:25

## 2018-09-11 RX ADMIN — BUDESONIDE SCH MG: 0.25 SUSPENSION RESPIRATORY (INHALATION) at 09:02

## 2018-09-11 RX ADMIN — Medication PRN EACH: at 12:09

## 2018-09-11 RX ADMIN — PIPERACILLIN SODIUM AND TAZOBACTAM SODIUM SCH MLS/HR: .5; 4 INJECTION, POWDER, LYOPHILIZED, FOR SOLUTION INTRAVENOUS at 12:10

## 2018-09-11 RX ADMIN — Medication SCH EACH: at 08:04

## 2018-09-11 RX ADMIN — Medication PRN EACH: at 18:55

## 2018-09-11 RX ADMIN — DEXTROSE MONOHYDRATE SCH MLS/HR: 50 INJECTION, SOLUTION INTRAVENOUS at 23:57

## 2018-09-11 RX ADMIN — PIPERACILLIN SODIUM AND TAZOBACTAM SODIUM SCH MLS/HR: .5; 4 INJECTION, POWDER, LYOPHILIZED, FOR SOLUTION INTRAVENOUS at 00:10

## 2018-09-11 RX ADMIN — PIPERACILLIN SODIUM AND TAZOBACTAM SODIUM SCH MLS/HR: .5; 4 INJECTION, POWDER, LYOPHILIZED, FOR SOLUTION INTRAVENOUS at 05:44

## 2018-09-11 RX ADMIN — OXYCODONE HYDROCHLORIDE AND ACETAMINOPHEN SCH MG: 500 TABLET ORAL at 17:08

## 2018-09-11 RX ADMIN — DEXTROSE MONOHYDRATE SCH MLS/HR: 50 INJECTION, SOLUTION INTRAVENOUS at 08:25

## 2018-09-11 RX ADMIN — TRAMADOL HYDROCHLORIDE PRN MG: 50 TABLET, FILM COATED ORAL at 18:55

## 2018-09-11 NOTE — NUR
RECEIVED PT TRACHED SHLY 6 CUFFED ON VENT. PT IS AWAKE AND ALERT FAMILY AT BEDSIDE. PT 
TOLERATING VENT SETTINGS. SX'D FOR SML AMT OF THIN TAN SECRETIONS. VENT ALARMS SET AND 
AUDIBLE. AMBU BAG AT BEDSIDE. VENT PLUGGED INTO RED OUTLET. WILL CONTINUE TO MONITOR.

-------------------------------------------------------------------------------

Addendum: 09/11/18 at 2042 by DEBORAH LERMA RT

-------------------------------------------------------------------------------

Amended: Links added.

## 2018-09-11 NOTE — NUR
RT



VENT SWITCHED TO LTV DUE TO PT BEING PLACED ON PMV AT TIMES. VENT IS PLUGGED INTO RED 
OUTLET. VENT ALARMS ARE SET AND AUDIBLE WITH BVM BY BEDSIDE. PT IS ON NOTED VENT SETTINGS, 
PEEP INCREASED PER DR. BEE ORDERS. NO RESPIRATORY DISTRESS NOTED AT THIS TIME.


-------------------------------------------------------------------------------

Addendum: 09/11/18 at 0929 by FANTASMA MARTÍNEZ RT

-------------------------------------------------------------------------------

Amended: Links added.

## 2018-09-11 NOTE — NUR
WOUND CARE CONSULT   WOUND CARE RECEIVED CONSULT FOR REFUGIO 11 AND UNSTAGEABLE SACRAL WOUND. 
 WOUND CARE WILL DEFER CONSULT AND ALL TREATMENT PLANS TO SURGICAL TEAM WHO ARE CURRENTLY 
FOLLOWING.  PATIENT WITH REFUGIO AT 11, ALL PRESSURE ULCER PREVENTION MEASURES ARE NOTED TO 
BE IN PLACE.  WILL SEE PRN.

## 2018-09-11 NOTE — NUR
REC'D PT. AT BEGINNING OF SHIFT, TRACHED/VENTED & A QUADRAPHLEGIC. PT. HAS "ALOT" OF SUPPORT 
SYSTEM TO VISIT DAY & NIGHT. C-COLLAR ON & INTACT. PT. NEEDS ALOT OF CARE, BEING A NEW QUAD. 
PT. STATES, "I KNOW I"M ANXIOUS", BUT HE IS ALLERGIC TO BENZO'S, MILK, TOMATOES & OXY 
PRODUCTS. TEMPS CAN STAY IN THE 96'S. HEATED BLANKETS PLACED ON PT. & EVENTUALLY ELEVATED. 
CANDIE CHIRINOS AT BS. ALL PULSES PALPABLE X 4 EXT. ROM'S DONE W/PT. DURING REPOSITIONING. 2 
PIV'S HAVE ALL PORTS PATENT TO FLUSH. 0.9%NS W/20 MeQ'S OF KCL INFUSING AT 75CC/HR. STAFF 
(RN & RT), MUST DEFLATE CUFF IN ORDER FOR PT. TO TALK,EAT,DRINK, ETC. SKIN ISSUES NOTED. 
MASON CATH TO GRAVITY W/GREAT UOP. PT. CAN TOLERATE A SOFT MECH. DIET. PT. CAN SWALLOW WHOLE 
PILLS W/H20. CONT. POC.

## 2018-09-11 NOTE — NUR
RT



PT RECEIVED WITH A SHILEY 6 CUFFED TRACH ON THE VENT WITH NOTED SETTINGS. PT IS AWAKE AND 
ALERT. VENT ALARMS ARE SET AND AUDIBLE WITH BVM BY BEDSIDE. MLT CUFF PRESSURE NOTED. VENT IS 
PLUGGED INTO RED OUTLET. HHN TX GIVEN INLINE WITH NO ADVERSE REACTIONS. SX'D MODERATE THICK 
TAN SECRETIONS. NO RESPIRATORY DISTRESS NOTED AT THIS TIME, WILL CONTINUE TO MONITOR.

-------------------------------------------------------------------------------

Addendum: 09/11/18 at 0929 by FANTASMA MARTÍNEZ RT

-------------------------------------------------------------------------------

Amended: Links added.

## 2018-09-11 NOTE — NUR
Received asleep,arousable,alert ,follows commands,seems coherent and appropriate.With 
tracheostomy #6 to the ventilator on AC mode,Fio2 @ 80 %,Peep-10,tolerating well,saturating  
99%.Quadriplegic,(-)sensationfrom chest down.Comfort  done,needs attended,visitor at 
bedside,patient communicating through mouth talk.Sacral pressure ulcer with dressing 
reinforced,back care done,turn to sides,both heels off loaded with foot boots.

## 2018-09-12 VITALS — DIASTOLIC BLOOD PRESSURE: 60 MMHG | SYSTOLIC BLOOD PRESSURE: 116 MMHG

## 2018-09-12 VITALS — SYSTOLIC BLOOD PRESSURE: 116 MMHG | DIASTOLIC BLOOD PRESSURE: 68 MMHG

## 2018-09-12 VITALS — DIASTOLIC BLOOD PRESSURE: 61 MMHG | SYSTOLIC BLOOD PRESSURE: 102 MMHG

## 2018-09-12 VITALS — DIASTOLIC BLOOD PRESSURE: 73 MMHG | SYSTOLIC BLOOD PRESSURE: 115 MMHG

## 2018-09-12 VITALS — SYSTOLIC BLOOD PRESSURE: 120 MMHG | DIASTOLIC BLOOD PRESSURE: 74 MMHG

## 2018-09-12 VITALS — DIASTOLIC BLOOD PRESSURE: 60 MMHG | SYSTOLIC BLOOD PRESSURE: 110 MMHG

## 2018-09-12 VITALS — SYSTOLIC BLOOD PRESSURE: 108 MMHG | DIASTOLIC BLOOD PRESSURE: 70 MMHG

## 2018-09-12 VITALS — SYSTOLIC BLOOD PRESSURE: 107 MMHG | DIASTOLIC BLOOD PRESSURE: 58 MMHG

## 2018-09-12 VITALS — SYSTOLIC BLOOD PRESSURE: 104 MMHG | DIASTOLIC BLOOD PRESSURE: 53 MMHG

## 2018-09-12 VITALS — DIASTOLIC BLOOD PRESSURE: 62 MMHG | SYSTOLIC BLOOD PRESSURE: 117 MMHG

## 2018-09-12 VITALS — DIASTOLIC BLOOD PRESSURE: 59 MMHG | SYSTOLIC BLOOD PRESSURE: 116 MMHG

## 2018-09-12 VITALS — DIASTOLIC BLOOD PRESSURE: 55 MMHG | SYSTOLIC BLOOD PRESSURE: 104 MMHG

## 2018-09-12 VITALS — SYSTOLIC BLOOD PRESSURE: 116 MMHG | DIASTOLIC BLOOD PRESSURE: 67 MMHG

## 2018-09-12 VITALS — DIASTOLIC BLOOD PRESSURE: 64 MMHG | SYSTOLIC BLOOD PRESSURE: 121 MMHG

## 2018-09-12 VITALS — SYSTOLIC BLOOD PRESSURE: 105 MMHG | DIASTOLIC BLOOD PRESSURE: 59 MMHG

## 2018-09-12 VITALS — DIASTOLIC BLOOD PRESSURE: 76 MMHG | SYSTOLIC BLOOD PRESSURE: 131 MMHG

## 2018-09-12 VITALS — DIASTOLIC BLOOD PRESSURE: 60 MMHG | SYSTOLIC BLOOD PRESSURE: 111 MMHG

## 2018-09-12 VITALS — SYSTOLIC BLOOD PRESSURE: 113 MMHG | DIASTOLIC BLOOD PRESSURE: 60 MMHG

## 2018-09-12 VITALS — SYSTOLIC BLOOD PRESSURE: 115 MMHG | DIASTOLIC BLOOD PRESSURE: 73 MMHG

## 2018-09-12 VITALS — SYSTOLIC BLOOD PRESSURE: 134 MMHG | DIASTOLIC BLOOD PRESSURE: 78 MMHG

## 2018-09-12 VITALS — SYSTOLIC BLOOD PRESSURE: 105 MMHG | DIASTOLIC BLOOD PRESSURE: 57 MMHG

## 2018-09-12 VITALS — DIASTOLIC BLOOD PRESSURE: 56 MMHG | SYSTOLIC BLOOD PRESSURE: 103 MMHG

## 2018-09-12 VITALS — DIASTOLIC BLOOD PRESSURE: 40 MMHG | SYSTOLIC BLOOD PRESSURE: 93 MMHG

## 2018-09-12 VITALS — SYSTOLIC BLOOD PRESSURE: 93 MMHG | DIASTOLIC BLOOD PRESSURE: 40 MMHG

## 2018-09-12 VITALS — DIASTOLIC BLOOD PRESSURE: 59 MMHG | SYSTOLIC BLOOD PRESSURE: 107 MMHG

## 2018-09-12 VITALS — DIASTOLIC BLOOD PRESSURE: 78 MMHG | SYSTOLIC BLOOD PRESSURE: 134 MMHG

## 2018-09-12 VITALS — SYSTOLIC BLOOD PRESSURE: 106 MMHG | DIASTOLIC BLOOD PRESSURE: 61 MMHG

## 2018-09-12 VITALS — SYSTOLIC BLOOD PRESSURE: 107 MMHG | DIASTOLIC BLOOD PRESSURE: 56 MMHG

## 2018-09-12 VITALS — DIASTOLIC BLOOD PRESSURE: 64 MMHG | SYSTOLIC BLOOD PRESSURE: 116 MMHG

## 2018-09-12 LAB
BASE EXCESS BLDA CALC-SCNC: 6.7 MMOL/L
BUN SERPL-MCNC: 6 MG/DL (ref 7–18)
CALCIUM SERPL-MCNC: 8.4 MG/DL (ref 8.5–10.1)
CHLORIDE SERPL-SCNC: 102 MMOL/L (ref 98–107)
CO2 SERPL-SCNC: 30 MMOL/L (ref 21–32)
CREAT SERPL-MCNC: 0.3 MG/DL (ref 0.6–1.3)
DO-HGB MFR BLDA: 272.2 MMHG
GLUCOSE SERPL-MCNC: 132 MG/DL (ref 74–106)
INHALED O2 CONCENTRATION: 60 %
INTRINSIC PEEP RESPIRATORY: 10 CM H2O
PCO2 TEMP ADJ BLDA: 48.5 MMHG (ref 35–45)
PEEP SETTING VENT: 500 ML
PH TEMP ADJ BLDA: 7.44 [PH] (ref 7.35–7.45)
PO2 TEMP ADJ BLDA: 102.3 MMHG (ref 75–100)
POTASSIUM SERPL-SCNC: 4.1 MMOL/L (ref 3.5–5.1)
SAO2 % BLDA: 97.5 % (ref 92–98.5)
SET RATE, BG: 16
SODIUM SERPL-SCNC: 137 MMOL/L (ref 136–145)

## 2018-09-12 PROCEDURE — 0KBN0ZZ EXCISION OF RIGHT HIP MUSCLE, OPEN APPROACH: ICD-10-PCS | Performed by: NURSE PRACTITIONER

## 2018-09-12 PROCEDURE — 0KBP0ZZ EXCISION OF LEFT HIP MUSCLE, OPEN APPROACH: ICD-10-PCS | Performed by: NURSE PRACTITIONER

## 2018-09-12 RX ADMIN — PIPERACILLIN SODIUM AND TAZOBACTAM SODIUM SCH MLS/HR: .5; 4 INJECTION, POWDER, LYOPHILIZED, FOR SOLUTION INTRAVENOUS at 13:06

## 2018-09-12 RX ADMIN — QUETIAPINE SCH MG: 25 TABLET, FILM COATED ORAL at 16:07

## 2018-09-12 RX ADMIN — Medication SCH APPLIC: at 10:37

## 2018-09-12 RX ADMIN — ACETAMINOPHEN PRN MG: 160 SOLUTION ORAL at 13:31

## 2018-09-12 RX ADMIN — DEXTROSE MONOHYDRATE SCH MLS/HR: 50 INJECTION, SOLUTION INTRAVENOUS at 08:44

## 2018-09-12 RX ADMIN — ALBUTEROL SULFATE SCH MG: 2.5 SOLUTION RESPIRATORY (INHALATION) at 19:35

## 2018-09-12 RX ADMIN — TRAMADOL HYDROCHLORIDE PRN MG: 50 TABLET, FILM COATED ORAL at 11:03

## 2018-09-12 RX ADMIN — DEXTROSE MONOHYDRATE SCH MLS/HR: 50 INJECTION, SOLUTION INTRAVENOUS at 23:56

## 2018-09-12 RX ADMIN — LIDOCAINE HYDROCHLORIDE PRN ML: 20 SOLUTION ORAL; TOPICAL at 05:07

## 2018-09-12 RX ADMIN — PIPERACILLIN SODIUM AND TAZOBACTAM SODIUM SCH MLS/HR: .5; 4 INJECTION, POWDER, LYOPHILIZED, FOR SOLUTION INTRAVENOUS at 05:52

## 2018-09-12 RX ADMIN — OXYCODONE HYDROCHLORIDE AND ACETAMINOPHEN SCH MG: 500 TABLET ORAL at 16:07

## 2018-09-12 RX ADMIN — DEXTROSE MONOHYDRATE SCH MLS/HR: 50 INJECTION, SOLUTION INTRAVENOUS at 16:15

## 2018-09-12 RX ADMIN — Medication SCH EACH: at 10:35

## 2018-09-12 RX ADMIN — BUDESONIDE SCH MG: 0.25 SUSPENSION RESPIRATORY (INHALATION) at 09:55

## 2018-09-12 RX ADMIN — ACETYLCYSTEINE SCH MG: 100 INHALANT RESPIRATORY (INHALATION) at 08:05

## 2018-09-12 RX ADMIN — PIPERACILLIN SODIUM AND TAZOBACTAM SODIUM SCH MLS/HR: .5; 4 INJECTION, POWDER, LYOPHILIZED, FOR SOLUTION INTRAVENOUS at 23:05

## 2018-09-12 RX ADMIN — ALBUTEROL SULFATE SCH MG: 2.5 SOLUTION RESPIRATORY (INHALATION) at 13:02

## 2018-09-12 RX ADMIN — ACETYLCYSTEINE SCH MG: 100 INHALANT RESPIRATORY (INHALATION) at 21:58

## 2018-09-12 RX ADMIN — PIPERACILLIN SODIUM AND TAZOBACTAM SODIUM SCH MLS/HR: .5; 4 INJECTION, POWDER, LYOPHILIZED, FOR SOLUTION INTRAVENOUS at 19:05

## 2018-09-12 RX ADMIN — BUDESONIDE SCH MG: 0.25 SUSPENSION RESPIRATORY (INHALATION) at 21:58

## 2018-09-12 RX ADMIN — LIDOCAINE HYDROCHLORIDE PRN ML: 20 SOLUTION ORAL; TOPICAL at 13:07

## 2018-09-12 RX ADMIN — ENOXAPARIN SODIUM SCH MG: 40 INJECTION SUBCUTANEOUS at 20:36

## 2018-09-12 RX ADMIN — TRAMADOL HYDROCHLORIDE PRN MG: 50 TABLET, FILM COATED ORAL at 19:47

## 2018-09-12 RX ADMIN — OXYCODONE HYDROCHLORIDE AND ACETAMINOPHEN SCH MG: 500 TABLET ORAL at 10:34

## 2018-09-12 RX ADMIN — TRAZODONE HYDROCHLORIDE SCH MG: 50 TABLET ORAL at 22:05

## 2018-09-12 RX ADMIN — TRAMADOL HYDROCHLORIDE PRN MG: 50 TABLET, FILM COATED ORAL at 15:21

## 2018-09-12 RX ADMIN — ALBUTEROL SULFATE SCH MG: 2.5 SOLUTION RESPIRATORY (INHALATION) at 01:12

## 2018-09-12 RX ADMIN — SODIUM CHLORIDE, SODIUM LACTATE, POTASSIUM CHLORIDE, AND CALCIUM CHLORIDE PRN MLS/HR: .6; .31; .03; .02 INJECTION, SOLUTION INTRAVENOUS at 08:33

## 2018-09-12 RX ADMIN — ACETAMINOPHEN PRN MG: 160 SOLUTION ORAL at 22:05

## 2018-09-12 RX ADMIN — QUETIAPINE PRN MG: 25 TABLET, FILM COATED ORAL at 22:35

## 2018-09-12 RX ADMIN — PIPERACILLIN SODIUM AND TAZOBACTAM SODIUM SCH MLS/HR: .5; 4 INJECTION, POWDER, LYOPHILIZED, FOR SOLUTION INTRAVENOUS at 00:14

## 2018-09-12 RX ADMIN — ALBUTEROL SULFATE SCH MG: 2.5 SOLUTION RESPIRATORY (INHALATION) at 08:06

## 2018-09-12 RX ADMIN — Medication SCH MG: at 10:33

## 2018-09-12 RX ADMIN — Medication SCH EACH: at 16:07

## 2018-09-12 RX ADMIN — QUETIAPINE PRN MG: 25 TABLET, FILM COATED ORAL at 11:28

## 2018-09-12 NOTE — NUR
RN INITIAL NOTES



RECEIVED THE PATIENT AWAKE ON BED, A/O X4, ABLE TO MOUTH WORDS. ON VENT WITH SETTINGS AC 16, 
, FIO2 60%, PEEP 10, SHILEY 6, SATURATING WELL, NO S/S OF RESP DISTRESS. PT IS SR ON 
THE MONITOR, HR 80'S. MASON CATH INTACT. PT HAS QUADRIPLEGIA. ABLE TO SWALLOW FOOD AND 
MEDICATIONS. RIGHT HAND 18G AND RIGHT FOREARM 20G, FLUSHED AND PATENT, NO S/S OF 
INFILTRATION/INFECTION, DRESSINGS CDI. BED LOW AND LOCKED, SIDERAILS UP, BED ALARM ON. WILL 
MONITOR

## 2018-09-12 NOTE — NUR
RECEIVED AN ORDER FROM DR BEE TO DECREASE FIO2 TO 50%. IF SPO2 REMAINS ABOVE 95%, REDUCE 
PEEP TO 5 

VERBAL READBACK DONE

## 2018-09-12 NOTE — NUR
Remains stable,not in any distress,asleep but easily awakens,but looks really tired ,easily 
falls back to sleep,no complains of pain,denies any SOB.Needs attended.

## 2018-09-12 NOTE — NUR
INITIAL ICU NOTES: 

PATIENT RESTING IN BED. NONLABORED BREATHING NOTED. SPO2 WNL ON CURRENT VENT SETTINGS. 
AROUSABLE TO NAME AND TOUCH. PATIENT AOX4. SR ON TELE MONITOR WITH HR OF 80S. IV SITE ON 
RIGHT HAND GAUGE 18 PATENT AND INTACT. GAUGE 20 ON RIGHT FOREARM PATENT AND INTACT. MASON 
CATHETER INTACT AND DRAINING CLEAR YELLOW URINE.BED IN LOWEST LOCKED POSITION. CALL LIGHT 
WITHIN REACH.

## 2018-09-12 NOTE — NUR
RT

PATIENT REC'D TRACHED ON [facility] VENT WITH ORDERED SETTINGS GILLES WELL. FIO2 TITRATED FROM 80% TO 
60%. NURSE NOTIFIED. VENT ALARMS CHECKED + AUDIBLE. CUFF PRESSURE CHECKED MLT. PATIENT 
AIRWAY SUCTIONED WITH SMALL AMT OF TAN SECRETIONS. PATIENT AWAKE, ALERT, RESPONSIVE. NO SOB 
AT THIS TIME. PATIENT WAS INFORMED THAT HE WILL NOT BE USING HIS PMV TODAY BECAUSE OF HIS 
NEED FOR INCREASED FIO2 AND HIGH LEVEL OF PEEP. HE IS IN CRITICAL CONDITION AND WE CANNOT 
PUT HIM INTO A POSITION TO BECOME DISTRESSED. AMBU BAG AT HOB. 

-------------------------------------------------------------------------------

Addendum: 09/12/18 at 0819 by BRENNAN LION RT

-------------------------------------------------------------------------------

Amended: Links added.

## 2018-09-12 NOTE — NUR
Patient sleeping well comfortable,not in any distress .V/S stable.

0400 Patient awakens,wanted to eat,(ate a little assisted by visitor at bedside,tolerated 
well with cuff deflated while eating.Refused to get washed at this time ,repositioned and 
partial am care done,noticed rashes/redness all over arms ,bilateral feet,legs and chest and 
lateral mid axillary area and shoulders.

0430 messaged  and send some photos of the rashes,he responded right away,ordered 
Benadryl 25 mg and Solumedrol 40 mg iV and to discontinue the zyprexa and resume the 
Seroguel

0500 Comfort measures done.Refusing to have complete bath now.Also patient states that the 
Zyprexia really worked better for his anxiety than the Seroquel.

## 2018-09-12 NOTE — NUR
@4661 I WAS NOTIFIED BY JORDANA MARROQUIN THAT SHE DEFLATED THAT TRACH CUFF PER FAMILY REQUEST. PT IS 
EATING, FAMILY AT BEDSIDE. RN AT BEDSIDE. WILL CONTINUE TO MONITOR.

## 2018-09-12 NOTE — NUR
RN CLOSING NOTES:

PATIENT RESTING IN BED. NONLABORED BREATHING NOTED. NO SIGNS OF ANXIETY NOTED. SPO2 WAS 
BELOW 92% WHEN TRIAL OF FIO2 OF 50% DONE. CURRENT FIO2 AT60%, SPO2 > 97%, REST OF SETTINGS 
REMAINING THE SAME.  GOAL IS TO DECREASE FIO2 TO 50% PER DR BEE'S ORDERS. RT STAFF 
NOTIFIED. 

PATIENT SINUS RHYTHM ON TELE MONITOR WITH HR OF 80S. BED IN LOWEST LOCKED POSITION. PATIENT 
KEPT CLEAN AND DRY THROUGHOUT SHIFT.  IV SITES REMAINING INTACT AND PATENT. MASON CATHETER 
INTACT, DRAINING CLEAR YELLOW URINE.  WOUND CARE DONE. TURNED AND REPOSITIONED EVERY 2 
HOURS. COMPLETE LINEN CHANGE DONE DURING SHIFT. SUPPOSITORY ADMINISTERED FOR CONSTIPATION. 
CALL LIGHT WITHIN REACH. ENDORSED TO TOBI SIGALA

## 2018-09-13 VITALS — SYSTOLIC BLOOD PRESSURE: 99 MMHG | DIASTOLIC BLOOD PRESSURE: 53 MMHG

## 2018-09-13 VITALS — SYSTOLIC BLOOD PRESSURE: 103 MMHG | DIASTOLIC BLOOD PRESSURE: 57 MMHG

## 2018-09-13 VITALS — SYSTOLIC BLOOD PRESSURE: 110 MMHG | DIASTOLIC BLOOD PRESSURE: 63 MMHG

## 2018-09-13 VITALS — SYSTOLIC BLOOD PRESSURE: 102 MMHG | DIASTOLIC BLOOD PRESSURE: 58 MMHG

## 2018-09-13 VITALS — DIASTOLIC BLOOD PRESSURE: 60 MMHG | SYSTOLIC BLOOD PRESSURE: 107 MMHG

## 2018-09-13 VITALS — SYSTOLIC BLOOD PRESSURE: 105 MMHG | DIASTOLIC BLOOD PRESSURE: 54 MMHG

## 2018-09-13 VITALS — DIASTOLIC BLOOD PRESSURE: 64 MMHG | SYSTOLIC BLOOD PRESSURE: 122 MMHG

## 2018-09-13 VITALS — DIASTOLIC BLOOD PRESSURE: 60 MMHG | SYSTOLIC BLOOD PRESSURE: 112 MMHG

## 2018-09-13 VITALS — SYSTOLIC BLOOD PRESSURE: 108 MMHG | DIASTOLIC BLOOD PRESSURE: 57 MMHG

## 2018-09-13 VITALS — DIASTOLIC BLOOD PRESSURE: 63 MMHG | SYSTOLIC BLOOD PRESSURE: 117 MMHG

## 2018-09-13 VITALS — SYSTOLIC BLOOD PRESSURE: 112 MMHG | DIASTOLIC BLOOD PRESSURE: 62 MMHG

## 2018-09-13 VITALS — SYSTOLIC BLOOD PRESSURE: 113 MMHG | DIASTOLIC BLOOD PRESSURE: 66 MMHG

## 2018-09-13 VITALS — SYSTOLIC BLOOD PRESSURE: 107 MMHG | DIASTOLIC BLOOD PRESSURE: 55 MMHG

## 2018-09-13 VITALS — SYSTOLIC BLOOD PRESSURE: 112 MMHG | DIASTOLIC BLOOD PRESSURE: 60 MMHG

## 2018-09-13 VITALS — DIASTOLIC BLOOD PRESSURE: 62 MMHG | SYSTOLIC BLOOD PRESSURE: 112 MMHG

## 2018-09-13 VITALS — SYSTOLIC BLOOD PRESSURE: 125 MMHG | DIASTOLIC BLOOD PRESSURE: 82 MMHG

## 2018-09-13 VITALS — DIASTOLIC BLOOD PRESSURE: 66 MMHG | SYSTOLIC BLOOD PRESSURE: 113 MMHG

## 2018-09-13 VITALS — SYSTOLIC BLOOD PRESSURE: 97 MMHG | DIASTOLIC BLOOD PRESSURE: 49 MMHG

## 2018-09-13 VITALS — SYSTOLIC BLOOD PRESSURE: 119 MMHG | DIASTOLIC BLOOD PRESSURE: 69 MMHG

## 2018-09-13 VITALS — DIASTOLIC BLOOD PRESSURE: 60 MMHG | SYSTOLIC BLOOD PRESSURE: 100 MMHG

## 2018-09-13 VITALS — SYSTOLIC BLOOD PRESSURE: 103 MMHG | DIASTOLIC BLOOD PRESSURE: 63 MMHG

## 2018-09-13 VITALS — DIASTOLIC BLOOD PRESSURE: 51 MMHG | SYSTOLIC BLOOD PRESSURE: 102 MMHG

## 2018-09-13 VITALS — DIASTOLIC BLOOD PRESSURE: 57 MMHG | SYSTOLIC BLOOD PRESSURE: 103 MMHG

## 2018-09-13 VITALS — DIASTOLIC BLOOD PRESSURE: 52 MMHG | SYSTOLIC BLOOD PRESSURE: 100 MMHG

## 2018-09-13 VITALS — DIASTOLIC BLOOD PRESSURE: 53 MMHG | SYSTOLIC BLOOD PRESSURE: 99 MMHG

## 2018-09-13 VITALS — SYSTOLIC BLOOD PRESSURE: 104 MMHG | DIASTOLIC BLOOD PRESSURE: 56 MMHG

## 2018-09-13 VITALS — DIASTOLIC BLOOD PRESSURE: 57 MMHG | SYSTOLIC BLOOD PRESSURE: 98 MMHG

## 2018-09-13 VITALS — DIASTOLIC BLOOD PRESSURE: 48 MMHG | SYSTOLIC BLOOD PRESSURE: 99 MMHG

## 2018-09-13 VITALS — DIASTOLIC BLOOD PRESSURE: 63 MMHG | SYSTOLIC BLOOD PRESSURE: 103 MMHG

## 2018-09-13 VITALS — SYSTOLIC BLOOD PRESSURE: 99 MMHG | DIASTOLIC BLOOD PRESSURE: 48 MMHG

## 2018-09-13 VITALS — SYSTOLIC BLOOD PRESSURE: 102 MMHG | DIASTOLIC BLOOD PRESSURE: 51 MMHG

## 2018-09-13 VITALS — DIASTOLIC BLOOD PRESSURE: 54 MMHG | SYSTOLIC BLOOD PRESSURE: 105 MMHG

## 2018-09-13 VITALS — DIASTOLIC BLOOD PRESSURE: 56 MMHG | SYSTOLIC BLOOD PRESSURE: 104 MMHG

## 2018-09-13 LAB
BASOPHILS # BLD AUTO: 0 /CMM (ref 0–0.2)
BASOPHILS NFR BLD AUTO: 0.3 % (ref 0–2)
BUN SERPL-MCNC: 6 MG/DL (ref 7–18)
CALCIUM SERPL-MCNC: 8.7 MG/DL (ref 8.5–10.1)
CHLORIDE SERPL-SCNC: 100 MMOL/L (ref 98–107)
CO2 SERPL-SCNC: 33 MMOL/L (ref 21–32)
CREAT SERPL-MCNC: 0.4 MG/DL (ref 0.6–1.3)
EOSINOPHIL NFR BLD AUTO: 0.9 % (ref 0–6)
GLUCOSE SERPL-MCNC: 100 MG/DL (ref 74–106)
HCT VFR BLD AUTO: 31 % (ref 39–51)
HGB BLD-MCNC: 10.2 G/DL (ref 13.5–17.5)
LYMPHOCYTES NFR BLD AUTO: 1.5 /CMM (ref 0.8–4.8)
LYMPHOCYTES NFR BLD AUTO: 12.6 % (ref 20–44)
MCH RBC QN AUTO: 29 PG (ref 26–33)
MCHC RBC AUTO-ENTMCNC: 33 G/DL (ref 31–36)
MCV RBC AUTO: 88 FL (ref 80–96)
MONOCYTES NFR BLD AUTO: 0.8 /CMM (ref 0.1–1.3)
MONOCYTES NFR BLD AUTO: 7 % (ref 2–12)
NEUTROPHILS # BLD AUTO: 9.5 /CMM (ref 1.8–8.9)
NEUTROPHILS NFR BLD AUTO: 79.2 % (ref 43–81)
PLATELET # BLD AUTO: 362 /CMM (ref 150–450)
POTASSIUM SERPL-SCNC: 3.2 MMOL/L (ref 3.5–5.1)
RBC # BLD AUTO: 3.57 MIL/UL (ref 4.5–6)
RDW COEFFICIENT OF VARIATION: 14.8 (ref 11.5–15)
SODIUM SERPL-SCNC: 136 MMOL/L (ref 136–145)
WBC NRBC COR # BLD AUTO: 12 K/UL (ref 4.3–11)

## 2018-09-13 RX ADMIN — BUDESONIDE SCH MG: 0.25 SUSPENSION RESPIRATORY (INHALATION) at 09:52

## 2018-09-13 RX ADMIN — DEXTROSE MONOHYDRATE SCH MLS/HR: 50 INJECTION, SOLUTION INTRAVENOUS at 16:40

## 2018-09-13 RX ADMIN — OXYCODONE HYDROCHLORIDE AND ACETAMINOPHEN SCH MG: 500 TABLET ORAL at 09:51

## 2018-09-13 RX ADMIN — ACETAMINOPHEN PRN MG: 160 SOLUTION ORAL at 04:13

## 2018-09-13 RX ADMIN — PIPERACILLIN SODIUM AND TAZOBACTAM SODIUM SCH MLS/HR: .5; 4 INJECTION, POWDER, LYOPHILIZED, FOR SOLUTION INTRAVENOUS at 05:18

## 2018-09-13 RX ADMIN — ALBUTEROL SULFATE SCH MG: 2.5 SOLUTION RESPIRATORY (INHALATION) at 07:31

## 2018-09-13 RX ADMIN — Medication SCH MG: at 09:51

## 2018-09-13 RX ADMIN — BUDESONIDE SCH MG: 0.25 SUSPENSION RESPIRATORY (INHALATION) at 19:52

## 2018-09-13 RX ADMIN — Medication SCH APPLIC: at 09:54

## 2018-09-13 RX ADMIN — TRAMADOL HYDROCHLORIDE PRN MG: 50 TABLET, FILM COATED ORAL at 20:29

## 2018-09-13 RX ADMIN — ACETAMINOPHEN PRN MG: 160 SOLUTION ORAL at 19:50

## 2018-09-13 RX ADMIN — Medication SCH EACH: at 09:51

## 2018-09-13 RX ADMIN — PIPERACILLIN SODIUM AND TAZOBACTAM SODIUM SCH MLS/HR: .5; 4 INJECTION, POWDER, LYOPHILIZED, FOR SOLUTION INTRAVENOUS at 16:51

## 2018-09-13 RX ADMIN — QUETIAPINE PRN MG: 25 TABLET, FILM COATED ORAL at 23:16

## 2018-09-13 RX ADMIN — ALBUTEROL SULFATE SCH MG: 2.5 SOLUTION RESPIRATORY (INHALATION) at 19:52

## 2018-09-13 RX ADMIN — PIPERACILLIN SODIUM AND TAZOBACTAM SODIUM SCH MLS/HR: .5; 4 INJECTION, POWDER, LYOPHILIZED, FOR SOLUTION INTRAVENOUS at 12:21

## 2018-09-13 RX ADMIN — TRAMADOL HYDROCHLORIDE PRN MG: 50 TABLET, FILM COATED ORAL at 04:18

## 2018-09-13 RX ADMIN — ENOXAPARIN SODIUM SCH MG: 40 INJECTION SUBCUTANEOUS at 21:36

## 2018-09-13 RX ADMIN — ALBUTEROL SULFATE SCH MG: 2.5 SOLUTION RESPIRATORY (INHALATION) at 13:17

## 2018-09-13 RX ADMIN — ALBUTEROL SULFATE SCH MG: 2.5 SOLUTION RESPIRATORY (INHALATION) at 01:43

## 2018-09-13 RX ADMIN — OXYCODONE HYDROCHLORIDE AND ACETAMINOPHEN SCH MG: 500 TABLET ORAL at 16:51

## 2018-09-13 RX ADMIN — DEXTROSE MONOHYDRATE SCH MLS/HR: 50 INJECTION, SOLUTION INTRAVENOUS at 07:59

## 2018-09-13 RX ADMIN — ACETYLCYSTEINE SCH MG: 100 INHALANT RESPIRATORY (INHALATION) at 07:31

## 2018-09-13 RX ADMIN — Medication SCH EACH: at 16:40

## 2018-09-13 RX ADMIN — PIPERACILLIN SODIUM AND TAZOBACTAM SODIUM SCH MLS/HR: .5; 4 INJECTION, POWDER, LYOPHILIZED, FOR SOLUTION INTRAVENOUS at 23:16

## 2018-09-13 RX ADMIN — QUETIAPINE PRN MG: 25 TABLET, FILM COATED ORAL at 12:20

## 2018-09-13 RX ADMIN — ACETYLCYSTEINE SCH MG: 100 INHALANT RESPIRATORY (INHALATION) at 19:52

## 2018-09-13 RX ADMIN — QUETIAPINE SCH MG: 25 TABLET, FILM COATED ORAL at 07:59

## 2018-09-13 RX ADMIN — TRAZODONE HYDROCHLORIDE SCH MG: 50 TABLET ORAL at 21:36

## 2018-09-13 RX ADMIN — QUETIAPINE SCH MG: 25 TABLET, FILM COATED ORAL at 18:17

## 2018-09-13 NOTE — NUR
RT





PT RECEIVED ON [facility] VENT WITH NOTED SETTING. PT AWAKE/ALERT. TRACH PATENT AND SECURE VIA 
TRACH TIES. MLT NOTED. VENT TO RED OUTLET. ALARM SET AND AUDIBLE. CHRISTINEU BAT AT \[facility]\"".  PT 
TOLERATING VENT SETTING WELL. NO SOB OR RESP DISTRESS NOTED. 



FIO2 AND PEEP TITRATED PER MD BEE ORDERS. 

-------------------------------------------------------------------------------

Addendum: 09/13/18 at 0517 by CRISTOBAL SILVA RT

-------------------------------------------------------------------------------

Amended: Links added.

## 2018-09-13 NOTE — NUR
FIO2 DECREASED TO 40%

-------------------------------------------------------------------------------

Addendum: 09/13/18 at 1721 by MAVIS SAMANIEGO RT

-------------------------------------------------------------------------------

Amended: Links added.

## 2018-09-13 NOTE — NUR
RN CLOSING NOTES



PT REMAINS STABLE AS OF THE MOMENT. ALL DUE MEDS GIVEN, PARTIAL BATH PROVIDED. FIO2 IS 50% 
AND PEEP 7, SATURATING > 95%. WILL ENDORSE RENATA TO AM RN

## 2018-09-13 NOTE — NUR
RECEIVED PT INTUBATED ON VENT. PT TOLERATING VENT SETTINGS. SX'D FOR SML AMT OF THICK TINGED 
SECRETIONS. VENT ALARMS SET AND AUDIBLE AMBU BAG AT BEDSIDE, CUFF MLT. VENT PLUGGED INTO RED 
OUTLET. WILL CONTINUE TO MONITOR.

-------------------------------------------------------------------------------

Addendum: 09/13/18 at 1959 by NIKOLAS CALIXTO RT

-------------------------------------------------------------------------------

Amended: Links added.

## 2018-09-13 NOTE — NUR
VENT CHANGES BELOW PER DR. BEE.



PEEP 5

-------------------------------------------------------------------------------

Addendum: 09/13/18 at 0908 by MAVIS SAMANIEGO RT

-------------------------------------------------------------------------------

Amended: Links added.

## 2018-09-13 NOTE — NUR
DR SALEH IS HERE TO SEE PT. LABS REPORTED. PT STATES HE NEEDS ANXIETY MEDICATION MORE OFTEN. 
DR CHANGED THE PRN DOSE TO Q6.   IN ADDICTION PATIENT AND HIS FATHER STATED THAT THEY NEED 
TO TALK TO CASE MANAGEMENT REGARDING DISCHARGE.

## 2018-09-13 NOTE — NUR
DECREASED FIO2 FROM 50% TO 45% AS ORDER.

-------------------------------------------------------------------------------

Addendum: 09/13/18 at 1153 by MAVIS SAMANIEGO RT

-------------------------------------------------------------------------------

Amended: Links added.

## 2018-09-13 NOTE — NUR
Received patient awake,alert,wtih tracheostomy to the ventilator on AC mode,but patient able 
to speak (with low soft voice),follows commands,coherent and appropriate.Not in any distress 
,breathing regular and non labored.All extremities flaccid,no sensation from chest down and 
both arms,with facial expression,grimaces and +cough and gag..Comfort care done ,turn to 
side.

2000 With periods of anxiety ,visitor at bedside ,supportive of patient and participating in 
patient's care.

## 2018-09-14 VITALS — SYSTOLIC BLOOD PRESSURE: 96 MMHG | DIASTOLIC BLOOD PRESSURE: 56 MMHG

## 2018-09-14 VITALS — DIASTOLIC BLOOD PRESSURE: 65 MMHG | SYSTOLIC BLOOD PRESSURE: 111 MMHG

## 2018-09-14 VITALS — DIASTOLIC BLOOD PRESSURE: 70 MMHG | SYSTOLIC BLOOD PRESSURE: 114 MMHG

## 2018-09-14 VITALS — SYSTOLIC BLOOD PRESSURE: 102 MMHG | DIASTOLIC BLOOD PRESSURE: 67 MMHG

## 2018-09-14 VITALS — SYSTOLIC BLOOD PRESSURE: 122 MMHG | DIASTOLIC BLOOD PRESSURE: 68 MMHG

## 2018-09-14 VITALS — SYSTOLIC BLOOD PRESSURE: 118 MMHG | DIASTOLIC BLOOD PRESSURE: 66 MMHG

## 2018-09-14 VITALS — SYSTOLIC BLOOD PRESSURE: 111 MMHG | DIASTOLIC BLOOD PRESSURE: 65 MMHG

## 2018-09-14 VITALS — DIASTOLIC BLOOD PRESSURE: 67 MMHG | SYSTOLIC BLOOD PRESSURE: 119 MMHG

## 2018-09-14 VITALS — SYSTOLIC BLOOD PRESSURE: 108 MMHG | DIASTOLIC BLOOD PRESSURE: 56 MMHG

## 2018-09-14 VITALS — SYSTOLIC BLOOD PRESSURE: 129 MMHG | DIASTOLIC BLOOD PRESSURE: 82 MMHG

## 2018-09-14 VITALS — DIASTOLIC BLOOD PRESSURE: 49 MMHG | SYSTOLIC BLOOD PRESSURE: 96 MMHG

## 2018-09-14 VITALS — SYSTOLIC BLOOD PRESSURE: 116 MMHG | DIASTOLIC BLOOD PRESSURE: 73 MMHG

## 2018-09-14 VITALS — DIASTOLIC BLOOD PRESSURE: 60 MMHG | SYSTOLIC BLOOD PRESSURE: 102 MMHG

## 2018-09-14 VITALS — SYSTOLIC BLOOD PRESSURE: 114 MMHG | DIASTOLIC BLOOD PRESSURE: 70 MMHG

## 2018-09-14 VITALS — DIASTOLIC BLOOD PRESSURE: 73 MMHG | SYSTOLIC BLOOD PRESSURE: 119 MMHG

## 2018-09-14 VITALS — SYSTOLIC BLOOD PRESSURE: 93 MMHG | DIASTOLIC BLOOD PRESSURE: 54 MMHG

## 2018-09-14 VITALS — SYSTOLIC BLOOD PRESSURE: 129 MMHG | DIASTOLIC BLOOD PRESSURE: 89 MMHG

## 2018-09-14 VITALS — SYSTOLIC BLOOD PRESSURE: 129 MMHG | DIASTOLIC BLOOD PRESSURE: 71 MMHG

## 2018-09-14 VITALS — DIASTOLIC BLOOD PRESSURE: 73 MMHG | SYSTOLIC BLOOD PRESSURE: 118 MMHG

## 2018-09-14 VITALS — DIASTOLIC BLOOD PRESSURE: 71 MMHG | SYSTOLIC BLOOD PRESSURE: 129 MMHG

## 2018-09-14 VITALS — DIASTOLIC BLOOD PRESSURE: 57 MMHG | SYSTOLIC BLOOD PRESSURE: 104 MMHG

## 2018-09-14 VITALS — SYSTOLIC BLOOD PRESSURE: 119 MMHG | DIASTOLIC BLOOD PRESSURE: 70 MMHG

## 2018-09-14 VITALS — SYSTOLIC BLOOD PRESSURE: 107 MMHG | DIASTOLIC BLOOD PRESSURE: 64 MMHG

## 2018-09-14 VITALS — DIASTOLIC BLOOD PRESSURE: 59 MMHG | SYSTOLIC BLOOD PRESSURE: 118 MMHG

## 2018-09-14 VITALS — SYSTOLIC BLOOD PRESSURE: 110 MMHG | DIASTOLIC BLOOD PRESSURE: 59 MMHG

## 2018-09-14 VITALS — DIASTOLIC BLOOD PRESSURE: 75 MMHG | SYSTOLIC BLOOD PRESSURE: 138 MMHG

## 2018-09-14 VITALS — SYSTOLIC BLOOD PRESSURE: 106 MMHG | DIASTOLIC BLOOD PRESSURE: 65 MMHG

## 2018-09-14 VITALS — SYSTOLIC BLOOD PRESSURE: 123 MMHG | DIASTOLIC BLOOD PRESSURE: 74 MMHG

## 2018-09-14 VITALS — DIASTOLIC BLOOD PRESSURE: 56 MMHG | SYSTOLIC BLOOD PRESSURE: 108 MMHG

## 2018-09-14 VITALS — DIASTOLIC BLOOD PRESSURE: 67 MMHG | SYSTOLIC BLOOD PRESSURE: 123 MMHG

## 2018-09-14 LAB
BASE EXCESS BLDA CALC-SCNC: 6.3 MMOL/L
BASOPHILS # BLD AUTO: 0 /CMM (ref 0–0.2)
BASOPHILS NFR BLD AUTO: 0.3 % (ref 0–2)
BUN SERPL-MCNC: 6 MG/DL (ref 7–18)
CALCIUM SERPL-MCNC: 8.6 MG/DL (ref 8.5–10.1)
CHLORIDE SERPL-SCNC: 103 MMOL/L (ref 98–107)
CO2 SERPL-SCNC: 33 MMOL/L (ref 21–32)
CREAT SERPL-MCNC: 0.3 MG/DL (ref 0.6–1.3)
DO-HGB MFR BLDA: 237.4 MMHG
EOSINOPHIL NFR BLD AUTO: 1.6 % (ref 0–6)
GLUCOSE SERPL-MCNC: 106 MG/DL (ref 74–106)
HCT VFR BLD AUTO: 32 % (ref 39–51)
HGB BLD-MCNC: 10.5 G/DL (ref 13.5–17.5)
INHALED O2 CONCENTRATION: 50 %
LYMPHOCYTES NFR BLD AUTO: 1.2 /CMM (ref 0.8–4.8)
LYMPHOCYTES NFR BLD AUTO: 11.5 % (ref 20–44)
MCH RBC QN AUTO: 29 PG (ref 26–33)
MCHC RBC AUTO-ENTMCNC: 33 G/DL (ref 31–36)
MCV RBC AUTO: 87 FL (ref 80–96)
MONOCYTES NFR BLD AUTO: 0.9 /CMM (ref 0.1–1.3)
MONOCYTES NFR BLD AUTO: 8.1 % (ref 2–12)
NEUTROPHILS # BLD AUTO: 8.5 /CMM (ref 1.8–8.9)
NEUTROPHILS NFR BLD AUTO: 78.5 % (ref 43–81)
PCO2 TEMP ADJ BLDA: 40 MMHG (ref 35–45)
PH TEMP ADJ BLDA: 7.49 [PH] (ref 7.35–7.45)
PLATELET # BLD AUTO: 421 /CMM (ref 150–450)
PO2 TEMP ADJ BLDA: 74.1 MMHG (ref 75–100)
POTASSIUM SERPL-SCNC: 3.8 MMOL/L (ref 3.5–5.1)
RBC # BLD AUTO: 3.67 MIL/UL (ref 4.5–6)
RDW COEFFICIENT OF VARIATION: 15.6 (ref 11.5–15)
SAO2 % BLDA: 95 % (ref 92–98.5)
SODIUM SERPL-SCNC: 141 MMOL/L (ref 136–145)
VENTILATION MODE VENT: (no result)
WBC NRBC COR # BLD AUTO: 10.8 K/UL (ref 4.3–11)

## 2018-09-14 RX ADMIN — ACETYLCYSTEINE SCH MG: 100 INHALANT RESPIRATORY (INHALATION) at 09:55

## 2018-09-14 RX ADMIN — Medication SCH EACH: at 08:11

## 2018-09-14 RX ADMIN — Medication SCH MG: at 16:02

## 2018-09-14 RX ADMIN — Medication PRN MG: at 08:29

## 2018-09-14 RX ADMIN — QUETIAPINE PRN MG: 25 TABLET, FILM COATED ORAL at 19:33

## 2018-09-14 RX ADMIN — CEFEPIME HYDROCHLORIDE SCH MLS/HR: 1 INJECTION, POWDER, FOR SOLUTION INTRAMUSCULAR; INTRAVENOUS at 22:07

## 2018-09-14 RX ADMIN — Medication SCH MG: at 08:11

## 2018-09-14 RX ADMIN — ALBUTEROL SULFATE SCH MG: 2.5 SOLUTION RESPIRATORY (INHALATION) at 00:50

## 2018-09-14 RX ADMIN — ALBUTEROL SULFATE SCH MG: 2.5 SOLUTION RESPIRATORY (INHALATION) at 07:40

## 2018-09-14 RX ADMIN — Medication SCH APPLIC: at 08:11

## 2018-09-14 RX ADMIN — QUETIAPINE SCH MG: 25 TABLET, FILM COATED ORAL at 08:11

## 2018-09-14 RX ADMIN — THERA TABS SCH UDTAB: TAB at 08:11

## 2018-09-14 RX ADMIN — TRAMADOL HYDROCHLORIDE PRN MG: 50 TABLET, FILM COATED ORAL at 02:34

## 2018-09-14 RX ADMIN — Medication PRN EACH: at 14:05

## 2018-09-14 RX ADMIN — ACETYLCYSTEINE SCH MG: 100 INHALANT RESPIRATORY (INHALATION) at 21:26

## 2018-09-14 RX ADMIN — QUETIAPINE SCH MG: 25 TABLET, FILM COATED ORAL at 16:02

## 2018-09-14 RX ADMIN — Medication SCH EACH: at 16:02

## 2018-09-14 RX ADMIN — TRAMADOL HYDROCHLORIDE PRN MG: 50 TABLET, FILM COATED ORAL at 17:50

## 2018-09-14 RX ADMIN — BUDESONIDE SCH MG: 0.25 SUSPENSION RESPIRATORY (INHALATION) at 21:06

## 2018-09-14 RX ADMIN — CEFEPIME HYDROCHLORIDE SCH MLS/HR: 1 INJECTION, POWDER, FOR SOLUTION INTRAMUSCULAR; INTRAVENOUS at 12:00

## 2018-09-14 RX ADMIN — OXYCODONE HYDROCHLORIDE AND ACETAMINOPHEN SCH MG: 500 TABLET ORAL at 08:11

## 2018-09-14 RX ADMIN — QUETIAPINE FUMARATE SCH MG: 100 TABLET ORAL at 22:08

## 2018-09-14 RX ADMIN — PIPERACILLIN SODIUM AND TAZOBACTAM SODIUM SCH MLS/HR: .5; 4 INJECTION, POWDER, LYOPHILIZED, FOR SOLUTION INTRAVENOUS at 05:47

## 2018-09-14 RX ADMIN — ALBUTEROL SULFATE SCH MG: 2.5 SOLUTION RESPIRATORY (INHALATION) at 19:35

## 2018-09-14 RX ADMIN — DEXTROSE MONOHYDRATE SCH MLS/HR: 50 INJECTION, SOLUTION INTRAVENOUS at 00:00

## 2018-09-14 RX ADMIN — OXYCODONE HYDROCHLORIDE AND ACETAMINOPHEN SCH MG: 500 TABLET ORAL at 16:02

## 2018-09-14 RX ADMIN — DEXTROSE MONOHYDRATE SCH MLS/HR: 50 INJECTION, SOLUTION INTRAVENOUS at 07:42

## 2018-09-14 RX ADMIN — QUETIAPINE PRN MG: 25 TABLET, FILM COATED ORAL at 05:50

## 2018-09-14 RX ADMIN — ENOXAPARIN SODIUM SCH MG: 40 INJECTION SUBCUTANEOUS at 22:08

## 2018-09-14 RX ADMIN — ALBUTEROL SULFATE SCH MG: 2.5 SOLUTION RESPIRATORY (INHALATION) at 13:22

## 2018-09-14 RX ADMIN — ACETAMINOPHEN PRN MG: 160 SOLUTION ORAL at 01:03

## 2018-09-14 RX ADMIN — BUDESONIDE SCH MG: 0.25 SUSPENSION RESPIRATORY (INHALATION) at 09:55

## 2018-09-14 NOTE — NUR
Patient febrile T-101.3,cooling measures done.Message sent to  to refer about the 
fever.

Patient still complaining of not getting enough oxygen,hyperventilating,with deep ,and 
labored respiration.O2 saturation 93-94% but patient insisting he's not getting enough 
air.Respiratory Therapist at bedside supporting and monitoring patient .

0100 Patient still complaining he still not getting enough air,feeling very anxious and 
restless insisting he wants some more oxygen.Also ask if he can have Benadryl to maybe help 
him calm down.Message sent to Dr. Anaya if patient can have Benadryl.

Fio2 increased by RT to 60 %.Patient breathing more comfortable with 60 % Fio2 ,O2 
saturation 95-96%.Will continue to closely monitor breathing and saturation.Still awaiting 
call back/answer from .

## 2018-09-14 NOTE — NUR
ICU RN NOTE



RECEIVED PATIENT WITH FAMILY AND VISITOR AT BEDSIDE. PATIENT IS AWAKE AND ALERT AND ORIENTED 
X4. ABLE TO VERBALIZE NEEDS. PATIENT WITH SHILEY 6 CUFF DEFLATED AT THIS TIME. NO DISTRESS. 
TOLERATING CURRENT VENT SETTINGS WELL AT FIO2 OF 50%. NOTED WITH MODERATE THICK, YELLOW 
SECRETIONS. HOB ELEVATED. SR ON TELE WITH HR IN THE 80s. PIV ON R HAND AND R FOREARM FLUSHED 
AND INTACT, PATENT. F/C INTACT, DRAINING WELL VIA GRAVITY WITH CLEAR, YELLOW URINE. 
EXTREMITIES ELEVATED WITH PILLOWS. SAFETY AND COMFORT ENSURED. BED IN LOW AND LOCKED 
POSITION. FAMILY AT BEDSIDE. ON CLOSE MONITORING. NEEDS TO BE ANTICIPATED AND MET.

## 2018-09-14 NOTE — NUR
ICU RN NOTE



PATIENT VERBALIZED FRUSTRATIONS WITH INABILITY TO FALL ASLEEP FOR COUPLE DAYS NOW AND HIS 
ANXIETY. REVIEWED WITH DR. SALEH THE PATIENT'S CURRENT MEDICATIONS, ADJUSTMENTS WERE MADE BY 
MD. PATIENT MADE AWARE WITH UNDERSTANDING OF THE ADJUSTMENTS MADE. ORDER NOTED AND CARRIED 
OUT.

## 2018-09-14 NOTE — NUR
ICU RN NOTES

TRACH CUFF DEFLATED BY RT RASHAD  AS ORDERED TOLERATING CURRENT VENT SETTINGS WITH SPO2 OF 98% 
WITH NO SIGNS OF DISTRESS ,WILL CONTINUE TO MONITOR .

## 2018-09-14 NOTE — NUR
ICU RN NOTES

RECEIVED PATIENT AOX4 , NOT IN ACUTE DISTRESS , DENIES SOB AND DISCOMFORT AT THIS TIME , 
SPO2 % VIA MECHANICAL VENTILATOR SETTING AS ORDERED , TRACH OF SARAHI # 6 IN PLACE , 
SR 60-62 ON BEDSIDE MONITOR , FC DRAINING VIA GRAVITY WITH CLEAR YELLOW URINE , IV OF R HAND 
# 18 AND RFA # 20 PATENT AND INTACT WITH NS @ TKO , ALL NEEDS ATTENDED , BED ON LOW AND 
LOCKED POSITION ,SIDE RAILS X2 , CALL LIGHT WITHIN REACH , HOB @ 45 WILL CONTINUE TO MONITOR 
.

## 2018-09-14 NOTE — NUR
ICU RN NOTES

CALLED RT RASHAD TO INFLATE THE TRACH CUFF , TOLERATED MECHANICAL SOFT DIET WITH NO SIGNS OF 
ASPIRATION , HOB @ 45 FOR ASPIRATION PRECAUTION , TOLERATING CURRENT VENT SETTINGS WITH SPO2 
OF 98% , FIO2 TITRATED DOWN TO 50% , WILL CONTINUE TO MONITOR .

## 2018-09-14 NOTE — NUR
ICU RN NOTE



SEROQUEL AND TRAZODONE GIVEN AS ORDERED FOR PATIENT'S COMPLAINT OF INABILITY TO SLEEP. 
PATIENT ABLE TO REST COMFORTABLY AT THIS TIME WITH FAMILY AT BEDSIDE. PATIENT'S CUFF 
INFLATED, WITH GOOD TIDAL VOLUME, SATURATING WELL AT 98%. NO DISTRESS. WILL CONTINUE TO 
MONITOR.

## 2018-09-14 NOTE — NUR
Now calm,now trying to sleep,Just ask for pain medication(Ultram),but still febrile.[place]ong 
measures done.0400AM care done,sacral pressure ulcer dressing changed,trache care done.[place] 
comfortable after AM bath,went back to sleep 

0500 Still resting/sleeping well.Temp down to 99.1

## 2018-09-14 NOTE — NUR
ICU RN NOTES

SEEN AND EVALUATED BY DR BEE , PT IS AOX4 ,  DISCUSSED LABS , CHEST XRAY AND ABG RESULTS , 
AFEBRILE , T MAX .3 @ 0000 , TOLERATING MECHANICAL SOFT DIET WHILE THE TRACH CUF IS 
DEFLATED WITH NO SIGNS OF ASPIRATIONS OR DISTRESS , SPO2 OF 95% VIA MECHANICAL VENT ON AC 
MODE RATE OF 16 ,  FIO2 50% AND PEEP OF 5 , MD AWARE .

## 2018-09-14 NOTE — NUR
ICU RN NOTES

RT RASHAD AT BEDSIDE TO DEFLATE THE CUFF AS PT WANTS TO EAT , WILL CONTINUE TO MONITOR

## 2018-09-14 NOTE — NUR
PT RECEIVED TRACH WITH A SHILEY 6  ON THE VENT WITH NOTED SETTINGS. PT IS AWAKE AND ALERT 
AND CUFF DEFLATED AT THIS TIME, RN CLAUDETTE NOTIFIED. VENT ALARMS ARE SET AND AUDIBLE WITH 
AMBU BAG BY BEDSIDE.VENT IS PLUGGED INTO RED OUTLET. BREATHING TX GIVEN  AS ORDERED. NO 
ADVERSE REACTIONS NOTED. SX'D MODERATE THICK YELLOW  SECRETIONS. NO RESPIRATORY DISTRESS 
NOTED AT THIS TIME, WILL CONTINUE TO MONITOR.

----------------------------------

## 2018-09-14 NOTE — NUR
ICU RN NOTES

RECEIVED A CALL FROM DR SALEH , DISCUSSED LABS, CHEST XRAY ,CURRENT VENT SETTINGS WITH SPO2 
OF 60% AND PEEP OF 5 SPO2 OF 98% WITH NO SIGNS OF DISTRESS AND  PT IS FEBRILE T MAX .3 
WITH NO MARIN CULTURES , MD ONEAL ,PER MD , RECEIVED ORDERS TO START PT ON LEVAQUIN 500MG 
DAILY AND TRAZODONE 50MG QPM , ORDERS CARRIED OUT .

## 2018-09-14 NOTE — NUR
COMPLAINS OF GETTING ENOUGH OXYGEN,HYPERVENTILATING ,O2 SATURATION 93-92%.RESPIRATORY 
THERAPHIST AT BEDSIDE ,PULMONARY TOILETING DONE.PATIENT GETTING ANXIOUS OF NOT GETTING 
ENOUGH OXYGEN.pSYCHOLOGICAL SUPPORT DONE,PATIENT CALM DOWN FOR A WHILE.


-------------------------------------------------------------------------------

Addendum: 09/14/18 at 0135 by SHER STEPHEN RN

-------------------------------------------------------------------------------

Complains of not getting enough oxygen

## 2018-09-14 NOTE — NUR
RT NOTE



RECEIVED PT MECHANICALLY VENTILATED VIA SHILEY 6 DCT CUFFED TRACHEOSTOMY TUBE. CUFF INFLATED 
VIA MLT. VENTILATOR SETTINGS AS PRESCRIBED. ALARMS SET PER PROTOCOL AND AUDIBLE. VENT 
PLUGGED IN TO RED OUTLET. AMBU BAG AT BED SIDE. NO DISTRESS NOTED. ALARMS SET PER PROTOCOL 
AND AUDIBLE. NO DISTRESS NOTED AT MOMENT. 

-------------------------------------------------------------------------------

Addendum: 09/14/18 at 1127 by RASHAD REDD RT

-------------------------------------------------------------------------------

Amended: Links added.

## 2018-09-15 VITALS — DIASTOLIC BLOOD PRESSURE: 61 MMHG | SYSTOLIC BLOOD PRESSURE: 115 MMHG

## 2018-09-15 VITALS — DIASTOLIC BLOOD PRESSURE: 63 MMHG | SYSTOLIC BLOOD PRESSURE: 113 MMHG

## 2018-09-15 VITALS — DIASTOLIC BLOOD PRESSURE: 57 MMHG | SYSTOLIC BLOOD PRESSURE: 103 MMHG

## 2018-09-15 VITALS — SYSTOLIC BLOOD PRESSURE: 98 MMHG | DIASTOLIC BLOOD PRESSURE: 54 MMHG

## 2018-09-15 VITALS — SYSTOLIC BLOOD PRESSURE: 103 MMHG | DIASTOLIC BLOOD PRESSURE: 55 MMHG

## 2018-09-15 VITALS — SYSTOLIC BLOOD PRESSURE: 117 MMHG | DIASTOLIC BLOOD PRESSURE: 64 MMHG

## 2018-09-15 VITALS — DIASTOLIC BLOOD PRESSURE: 55 MMHG | SYSTOLIC BLOOD PRESSURE: 105 MMHG

## 2018-09-15 VITALS — SYSTOLIC BLOOD PRESSURE: 110 MMHG | DIASTOLIC BLOOD PRESSURE: 57 MMHG

## 2018-09-15 VITALS — DIASTOLIC BLOOD PRESSURE: 50 MMHG | SYSTOLIC BLOOD PRESSURE: 98 MMHG

## 2018-09-15 VITALS — SYSTOLIC BLOOD PRESSURE: 121 MMHG | DIASTOLIC BLOOD PRESSURE: 70 MMHG

## 2018-09-15 VITALS — DIASTOLIC BLOOD PRESSURE: 61 MMHG | SYSTOLIC BLOOD PRESSURE: 109 MMHG

## 2018-09-15 VITALS — DIASTOLIC BLOOD PRESSURE: 62 MMHG | SYSTOLIC BLOOD PRESSURE: 113 MMHG

## 2018-09-15 VITALS — SYSTOLIC BLOOD PRESSURE: 114 MMHG | DIASTOLIC BLOOD PRESSURE: 66 MMHG

## 2018-09-15 VITALS — SYSTOLIC BLOOD PRESSURE: 99 MMHG | DIASTOLIC BLOOD PRESSURE: 53 MMHG

## 2018-09-15 VITALS — SYSTOLIC BLOOD PRESSURE: 113 MMHG | DIASTOLIC BLOOD PRESSURE: 62 MMHG

## 2018-09-15 VITALS — DIASTOLIC BLOOD PRESSURE: 59 MMHG | SYSTOLIC BLOOD PRESSURE: 114 MMHG

## 2018-09-15 VITALS — SYSTOLIC BLOOD PRESSURE: 98 MMHG | DIASTOLIC BLOOD PRESSURE: 50 MMHG

## 2018-09-15 VITALS — DIASTOLIC BLOOD PRESSURE: 64 MMHG | SYSTOLIC BLOOD PRESSURE: 115 MMHG

## 2018-09-15 VITALS — SYSTOLIC BLOOD PRESSURE: 114 MMHG | DIASTOLIC BLOOD PRESSURE: 59 MMHG

## 2018-09-15 VITALS — DIASTOLIC BLOOD PRESSURE: 79 MMHG | SYSTOLIC BLOOD PRESSURE: 141 MMHG

## 2018-09-15 VITALS — SYSTOLIC BLOOD PRESSURE: 125 MMHG | DIASTOLIC BLOOD PRESSURE: 75 MMHG

## 2018-09-15 VITALS — SYSTOLIC BLOOD PRESSURE: 116 MMHG | DIASTOLIC BLOOD PRESSURE: 73 MMHG

## 2018-09-15 VITALS — DIASTOLIC BLOOD PRESSURE: 64 MMHG | SYSTOLIC BLOOD PRESSURE: 108 MMHG

## 2018-09-15 VITALS — SYSTOLIC BLOOD PRESSURE: 103 MMHG | DIASTOLIC BLOOD PRESSURE: 57 MMHG

## 2018-09-15 LAB
BASE EXCESS BLDA CALC-SCNC: 4.9 MMOL/L
BASOPHILS # BLD AUTO: 0 /CMM (ref 0–0.2)
BASOPHILS NFR BLD AUTO: 0.4 % (ref 0–2)
BUN SERPL-MCNC: 8 MG/DL (ref 7–18)
CALCIUM SERPL-MCNC: 8.7 MG/DL (ref 8.5–10.1)
CHLORIDE SERPL-SCNC: 103 MMOL/L (ref 98–107)
CO2 SERPL-SCNC: 30 MMOL/L (ref 21–32)
CREAT SERPL-MCNC: 0.4 MG/DL (ref 0.6–1.3)
DO-HGB MFR BLDA: 224.7 MMHG
EOSINOPHIL NFR BLD AUTO: 3.5 % (ref 0–6)
GLUCOSE SERPL-MCNC: 106 MG/DL (ref 74–106)
HCT VFR BLD AUTO: 34 % (ref 39–51)
HGB BLD-MCNC: 10.8 G/DL (ref 13.5–17.5)
INHALED O2 CONCENTRATION: 50 %
LYMPHOCYTES NFR BLD AUTO: 1.5 /CMM (ref 0.8–4.8)
LYMPHOCYTES NFR BLD AUTO: 17.6 % (ref 20–44)
MCH RBC QN AUTO: 28 PG (ref 26–33)
MCHC RBC AUTO-ENTMCNC: 32 G/DL (ref 31–36)
MCV RBC AUTO: 87 FL (ref 80–96)
MONOCYTES NFR BLD AUTO: 0.8 /CMM (ref 0.1–1.3)
MONOCYTES NFR BLD AUTO: 9.1 % (ref 2–12)
NEUTROPHILS # BLD AUTO: 5.9 /CMM (ref 1.8–8.9)
NEUTROPHILS NFR BLD AUTO: 69.4 % (ref 43–81)
PCO2 TEMP ADJ BLDA: 41.8 MMHG (ref 35–45)
PH TEMP ADJ BLDA: 7.46 [PH] (ref 7.35–7.45)
PLATELET # BLD AUTO: 416 /CMM (ref 150–450)
PO2 TEMP ADJ BLDA: 84.8 MMHG (ref 75–100)
POTASSIUM SERPL-SCNC: 4 MMOL/L (ref 3.5–5.1)
RBC # BLD AUTO: 3.88 MIL/UL (ref 4.5–6)
RDW COEFFICIENT OF VARIATION: 15 (ref 11.5–15)
SAO2 % BLDA: 95.8 % (ref 92–98.5)
SODIUM SERPL-SCNC: 139 MMOL/L (ref 136–145)
VENTILATION MODE VENT: (no result)
WBC NRBC COR # BLD AUTO: 8.5 K/UL (ref 4.3–11)

## 2018-09-15 RX ADMIN — ALBUTEROL SULFATE SCH MG: 2.5 SOLUTION RESPIRATORY (INHALATION) at 19:16

## 2018-09-15 RX ADMIN — ALBUTEROL SULFATE SCH MG: 2.5 SOLUTION RESPIRATORY (INHALATION) at 07:21

## 2018-09-15 RX ADMIN — Medication SCH MG: at 08:08

## 2018-09-15 RX ADMIN — Medication PRN EACH: at 11:11

## 2018-09-15 RX ADMIN — BUDESONIDE SCH MG: 0.25 SUSPENSION RESPIRATORY (INHALATION) at 09:36

## 2018-09-15 RX ADMIN — THERA TABS SCH UDTAB: TAB at 08:08

## 2018-09-15 RX ADMIN — Medication PRN EACH: at 23:51

## 2018-09-15 RX ADMIN — Medication SCH APPLIC: at 08:08

## 2018-09-15 RX ADMIN — CEFEPIME HYDROCHLORIDE SCH MLS/HR: 1 INJECTION, POWDER, FOR SOLUTION INTRAMUSCULAR; INTRAVENOUS at 21:04

## 2018-09-15 RX ADMIN — ACETYLCYSTEINE SCH MG: 100 INHALANT RESPIRATORY (INHALATION) at 09:36

## 2018-09-15 RX ADMIN — Medication SCH MG: at 08:07

## 2018-09-15 RX ADMIN — Medication SCH MG: at 16:36

## 2018-09-15 RX ADMIN — TRAZODONE HYDROCHLORIDE SCH MG: 50 TABLET ORAL at 21:05

## 2018-09-15 RX ADMIN — CEFEPIME HYDROCHLORIDE SCH MLS/HR: 1 INJECTION, POWDER, FOR SOLUTION INTRAMUSCULAR; INTRAVENOUS at 04:17

## 2018-09-15 RX ADMIN — QUETIAPINE SCH MG: 25 TABLET, FILM COATED ORAL at 08:08

## 2018-09-15 RX ADMIN — CEFEPIME HYDROCHLORIDE SCH MLS/HR: 1 INJECTION, POWDER, FOR SOLUTION INTRAMUSCULAR; INTRAVENOUS at 12:06

## 2018-09-15 RX ADMIN — ACETYLCYSTEINE SCH MG: 100 INHALANT RESPIRATORY (INHALATION) at 21:17

## 2018-09-15 RX ADMIN — Medication PRN EACH: at 16:45

## 2018-09-15 RX ADMIN — Medication PRN EACH: at 04:18

## 2018-09-15 RX ADMIN — ALBUTEROL SULFATE SCH MG: 2.5 SOLUTION RESPIRATORY (INHALATION) at 14:15

## 2018-09-15 RX ADMIN — MIRTAZAPINE SCH MG: 15 TABLET, FILM COATED ORAL at 21:04

## 2018-09-15 RX ADMIN — Medication SCH EACH: at 08:08

## 2018-09-15 RX ADMIN — Medication SCH EACH: at 16:43

## 2018-09-15 RX ADMIN — QUETIAPINE FUMARATE SCH MG: 100 TABLET ORAL at 21:04

## 2018-09-15 RX ADMIN — BUDESONIDE SCH MG: 0.25 SUSPENSION RESPIRATORY (INHALATION) at 21:17

## 2018-09-15 RX ADMIN — OXYCODONE HYDROCHLORIDE AND ACETAMINOPHEN SCH MG: 500 TABLET ORAL at 08:08

## 2018-09-15 RX ADMIN — QUETIAPINE PRN MG: 25 TABLET, FILM COATED ORAL at 04:30

## 2018-09-15 RX ADMIN — QUETIAPINE SCH MG: 25 TABLET, FILM COATED ORAL at 16:37

## 2018-09-15 RX ADMIN — QUETIAPINE PRN MG: 25 TABLET, FILM COATED ORAL at 15:07

## 2018-09-15 RX ADMIN — ALBUTEROL SULFATE SCH MG: 2.5 SOLUTION RESPIRATORY (INHALATION) at 01:27

## 2018-09-15 RX ADMIN — ENOXAPARIN SODIUM SCH MG: 40 INJECTION SUBCUTANEOUS at 21:42

## 2018-09-15 RX ADMIN — OXYCODONE HYDROCHLORIDE AND ACETAMINOPHEN SCH MG: 500 TABLET ORAL at 16:36

## 2018-09-15 NOTE — NUR
RT NOTE:



RECEIVED TRACH PT ON MECHANICAL VENTILATION. CUFF DEFLATED AT THIS TIME. AMBU BAG @ BEDSIDE. 
VENT PLUGGED INTO RED OUTLET. ALARMS ON AND AUDIBLE. HHN BREATHING TX GIVEN AS ORDERED PER 
MD. NO ADVERSE REACTIONS NOTED. SX SMALL AMOUNT OF THIN WHITE SECRETIONS. NO RESP DISTRESS 
NOTED. WILL CONT TO MONITOR PT.

-------------------------------------------------------------------------------

Addendum: 09/16/18 at 0017 by EVIE FUENTES RT

-------------------------------------------------------------------------------

Amended: Links added.

## 2018-09-15 NOTE — NUR
PT'S CUFF IS SLIGHTLY INFLATED AT THIS TIME. PT COMPLAINS OF DISCOMFORT WHEN CUFF IS FULLY 
INFLATED. PT IS TOLERATING SLIGHTLY INFLATED CUFF AT THIS TIME. WILL CONT TO MONITOR.

## 2018-09-15 NOTE — NUR
ICU RN NOTES

SEEN AND EVALUATED BY DR LAST , DISCUSSED PT HISTORY , PT HAS EPISODE OF ANXIETY AND 
PANIC ATTACKS , DOESN'T SLEEP AT NIGHT AND LOOK DEPRESSED . MD AWARE , AWAITING FOR ORDERS

## 2018-09-15 NOTE — NUR
ICU RN NOTE



Patient with no acute distress observed overnight. Patient however is observed to be very 
anxious. patient was supposed to have trach cuff inflated at bedtime, however, patient 
verbalizes feeling "suffocated" with the cuff inflated. with trach cuff inflated, patient is 
with tidal volume of 350-500. provided patient with health teaching and reinforcement with 
regards to the cuff inflation. Per patient's requests, trach cuff was deflated multiple 
times and with his anxiety and way of breathing, patient's tidal volume ranges from <100 but 
still able to reach 300-400 when calm. RN and RT on close monitoring.



5:17am, patient's FiO2 titrated down to 40%. Patient tolerating well, saturating at 95-96%, 
even and non-labored breathing. Patient resting well.

6:00am, patient observed to have woken up, anxious, stating "I need more air". VSS, no 
distress observed. Cuff deflated, still feeling short of breath. patient's FiO2 titrated 
back to 50% for noted desaturation to low 90s. Patient calmed down. patient saturating at 
95%. Family at bedside.

## 2018-09-15 NOTE — NUR
PT IS HAVING DISCOMFORT WITH CUFF BEING SLIGHTLY INFLATED. DEFLATED CUFF AT THIS TIME PER 
PATIENT'S REQUEST. WILL TRY AGAIN IN 30 MINS.

## 2018-09-15 NOTE — NUR
ICU RN NOTES

REPORT GIVEN AVINASH FOR CONTINUITY OF CARE

TRANSFERRED PT ON ROOM 103 TD VIA ACLS PROTOCOL , VSS , TOLERATING CURRENT VENT SETTINGS 
WITH SPO2 % VIA FIO2 OF 40% AND PEEP OF 5 , WITH  NO RENATA NOTED

## 2018-09-15 NOTE — NUR
ICU RN NOTES

TOLERATING MECHANICAL SOFT DIET WITH NO SIGNS OF ASPIRATION AND DISTRESS , HOB @ 45 , TRACH 
CUFF DEFLATED , WILL CONTINUE TO MONITOR .

## 2018-09-15 NOTE — NUR
[facility]/RN NOTES:



RECEIVED PT. IN BED W/ HOB ELEVATED. ON CONTACT PRECAUTION. ON ASPIRATIONS PRECAUTION. ON 
MECHANICAL SETTING AS PRESCRIBED TOLERATING WELL AT PRESENT. ABLE TO TAKE MEDS CRUSHED W/ 
APPLE SAUCE. NO S/S OF ANY RESPIRATORY DISTRESS. FAMILY FRIENDS AT BEDSIDE. W/ F/C IN PLACE 
DRAINING VIA GRAVITY. WILL CONTINUE TO MONITOR.

## 2018-09-15 NOTE — NUR
ICU RN NOTES

SEEN AND EVALUATED BY DR SALEH . DISCUSSED LABS , VSS AFEBRILE , SPO2 OF 98% VIA AC MODE FIO2 
OF 50% AND PEEP OF 5, PER MD TITRATED FIO2 TO 40% , AND ORDER PSYCH CONSULT FOR ANXIETY / 
PANIC ATTACK ,AND OK TO DOWNGRADE TO [facility] STATUS .  ORDER CARRIED OUT .

## 2018-09-15 NOTE — NUR
PT TRANSFERRED TO [facility] VIA AMBUBAG @ 15LPM. PT GILLES KEARNEY. PLACED ON DR PRESCRIBED VENT 
SETTINGS. MECHANICAL VENTILATOR PLUGGED INTO RED OUTLET. ALARMS SET AND AUDIBLE THROUGH OUT 
UNIT. SPARE EMIL AND NATHAN LEFT AT HEAD OF BED.  

-------------------------------------------------------------------------------

Addendum: 09/15/18 at 1617 by AVINASH SWAIN RT

-------------------------------------------------------------------------------

Amended: Links added.

## 2018-09-15 NOTE — NUR
ICU RN NOTES

RECEIVED PATIENT AOX4 , NOT IN ACUTE DISTRESS , DENIES SOB AND DISCOMFORT AT THIS TIME , 
SPO2 % VIA MECHANICAL VENTILATOR SETTING AS ORDERED , TRACH OF SHILEY # 6 IN PLACE 
DEFLATED , SR 64 ON BEDSIDE MONITOR , FC DRAINING VIA GRAVITY WITH CLEAR YELLOW URINE , IV 
OF R HAND # 18 AND RFA # 20 PATENT AND INTACT WITH NS @ TKO , ALL NEEDS ATTENDED , BED ON 
LOW AND LOCKED POSITION ,SIDE RAILS X2 , CALL LIGHT WITHIN REACH , HOB @ 45 WILL CONTINUE TO 
MONITOR .

## 2018-09-16 VITALS — DIASTOLIC BLOOD PRESSURE: 71 MMHG | SYSTOLIC BLOOD PRESSURE: 117 MMHG

## 2018-09-16 VITALS — SYSTOLIC BLOOD PRESSURE: 109 MMHG | DIASTOLIC BLOOD PRESSURE: 66 MMHG

## 2018-09-16 VITALS — SYSTOLIC BLOOD PRESSURE: 107 MMHG | DIASTOLIC BLOOD PRESSURE: 63 MMHG

## 2018-09-16 VITALS — SYSTOLIC BLOOD PRESSURE: 122 MMHG | DIASTOLIC BLOOD PRESSURE: 70 MMHG

## 2018-09-16 VITALS — DIASTOLIC BLOOD PRESSURE: 66 MMHG | SYSTOLIC BLOOD PRESSURE: 113 MMHG

## 2018-09-16 VITALS — DIASTOLIC BLOOD PRESSURE: 67 MMHG | SYSTOLIC BLOOD PRESSURE: 110 MMHG

## 2018-09-16 LAB
BASOPHILS # BLD AUTO: 0 /CMM (ref 0–0.2)
BASOPHILS NFR BLD AUTO: 0.1 % (ref 0–2)
BUN SERPL-MCNC: 10 MG/DL (ref 7–18)
CALCIUM SERPL-MCNC: 9 MG/DL (ref 8.5–10.1)
CHLORIDE SERPL-SCNC: 101 MMOL/L (ref 98–107)
CO2 SERPL-SCNC: 32 MMOL/L (ref 21–32)
CREAT SERPL-MCNC: 0.4 MG/DL (ref 0.6–1.3)
EOSINOPHIL NFR BLD AUTO: 2.9 % (ref 0–6)
GLUCOSE SERPL-MCNC: 122 MG/DL (ref 74–106)
HCT VFR BLD AUTO: 36 % (ref 39–51)
HGB BLD-MCNC: 11.6 G/DL (ref 13.5–17.5)
LYMPHOCYTES NFR BLD AUTO: 0.8 /CMM (ref 0.8–4.8)
LYMPHOCYTES NFR BLD AUTO: 8.6 % (ref 20–44)
MCH RBC QN AUTO: 27 PG (ref 26–33)
MCHC RBC AUTO-ENTMCNC: 32 G/DL (ref 31–36)
MCV RBC AUTO: 85 FL (ref 80–96)
MONOCYTES NFR BLD AUTO: 0.8 /CMM (ref 0.1–1.3)
MONOCYTES NFR BLD AUTO: 8 % (ref 2–12)
NEUTROPHILS # BLD AUTO: 7.7 /CMM (ref 1.8–8.9)
NEUTROPHILS NFR BLD AUTO: 80.4 % (ref 43–81)
PLATELET # BLD AUTO: 428 /CMM (ref 150–450)
POTASSIUM SERPL-SCNC: 4.2 MMOL/L (ref 3.5–5.1)
RBC # BLD AUTO: 4.26 MIL/UL (ref 4.5–6)
RDW COEFFICIENT OF VARIATION: 14.2 (ref 11.5–15)
SODIUM SERPL-SCNC: 139 MMOL/L (ref 136–145)
WBC NRBC COR # BLD AUTO: 9.6 K/UL (ref 4.3–11)

## 2018-09-16 RX ADMIN — QUETIAPINE SCH MG: 25 TABLET, FILM COATED ORAL at 08:11

## 2018-09-16 RX ADMIN — ALBUTEROL SULFATE SCH MG: 2.5 SOLUTION RESPIRATORY (INHALATION) at 19:41

## 2018-09-16 RX ADMIN — Medication SCH MG: at 08:11

## 2018-09-16 RX ADMIN — MIRTAZAPINE SCH MG: 15 TABLET, FILM COATED ORAL at 22:08

## 2018-09-16 RX ADMIN — Medication PRN EACH: at 20:02

## 2018-09-16 RX ADMIN — OXYCODONE HYDROCHLORIDE AND ACETAMINOPHEN SCH MG: 500 TABLET ORAL at 17:01

## 2018-09-16 RX ADMIN — ALBUTEROL SULFATE SCH MG: 2.5 SOLUTION RESPIRATORY (INHALATION) at 13:04

## 2018-09-16 RX ADMIN — Medication SCH MG: at 17:00

## 2018-09-16 RX ADMIN — CEFEPIME HYDROCHLORIDE SCH MLS/HR: 1 INJECTION, POWDER, FOR SOLUTION INTRAMUSCULAR; INTRAVENOUS at 21:26

## 2018-09-16 RX ADMIN — TRAZODONE HYDROCHLORIDE SCH MG: 50 TABLET ORAL at 22:10

## 2018-09-16 RX ADMIN — ACETYLCYSTEINE SCH MG: 100 INHALANT RESPIRATORY (INHALATION) at 08:29

## 2018-09-16 RX ADMIN — QUETIAPINE SCH MG: 25 TABLET, FILM COATED ORAL at 12:43

## 2018-09-16 RX ADMIN — OXYCODONE HYDROCHLORIDE AND ACETAMINOPHEN SCH MG: 500 TABLET ORAL at 08:11

## 2018-09-16 RX ADMIN — ENOXAPARIN SODIUM SCH MG: 40 INJECTION SUBCUTANEOUS at 22:11

## 2018-09-16 RX ADMIN — QUETIAPINE FUMARATE SCH MG: 100 TABLET ORAL at 22:09

## 2018-09-16 RX ADMIN — BUDESONIDE SCH MG: 0.25 SUSPENSION RESPIRATORY (INHALATION) at 19:41

## 2018-09-16 RX ADMIN — Medication SCH APPLIC: at 08:12

## 2018-09-16 RX ADMIN — Medication SCH EACH: at 08:11

## 2018-09-16 RX ADMIN — BUDESONIDE SCH MG: 0.25 SUSPENSION RESPIRATORY (INHALATION) at 08:29

## 2018-09-16 RX ADMIN — THERA TABS SCH UDTAB: TAB at 08:10

## 2018-09-16 RX ADMIN — Medication PRN EACH: at 15:31

## 2018-09-16 RX ADMIN — QUETIAPINE SCH MG: 25 TABLET, FILM COATED ORAL at 17:01

## 2018-09-16 RX ADMIN — ACETYLCYSTEINE SCH MG: 100 INHALANT RESPIRATORY (INHALATION) at 19:41

## 2018-09-16 RX ADMIN — Medication PRN EACH: at 10:25

## 2018-09-16 RX ADMIN — CEFEPIME HYDROCHLORIDE SCH MLS/HR: 1 INJECTION, POWDER, FOR SOLUTION INTRAMUSCULAR; INTRAVENOUS at 05:17

## 2018-09-16 RX ADMIN — CEFEPIME HYDROCHLORIDE SCH MLS/HR: 1 INJECTION, POWDER, FOR SOLUTION INTRAMUSCULAR; INTRAVENOUS at 12:43

## 2018-09-16 RX ADMIN — ALBUTEROL SULFATE SCH MG: 2.5 SOLUTION RESPIRATORY (INHALATION) at 07:28

## 2018-09-16 RX ADMIN — Medication PRN MG: at 17:06

## 2018-09-16 RX ADMIN — ALBUTEROL SULFATE SCH MG: 2.5 SOLUTION RESPIRATORY (INHALATION) at 00:36

## 2018-09-16 RX ADMIN — Medication SCH EACH: at 17:05

## 2018-09-16 NOTE — NUR
PT CONTINUES TO HAVE DISCOMFORT AND WANTED CUFF TO BE DEFLATED AT THIS TIME. WILL COME BACK 
TO INFLATE CUFF.

## 2018-09-16 NOTE — NUR
RN [facility] ENDING NOTES



PATIENT RESTING IN BED WITH NO ACUTE DISTRESS NOTED, ALL NEEDS MET, BED BATH PROVIDED WITH 
WOUND TX AS MD ORDERED, STABLE WILL CONTINUE CARE AND ENDORSE TO PM NURSE.

## 2018-09-16 NOTE — NUR
[facility]/LVN

PT APPEARS TO BE RESTING COMFORTABLY, FATHER AT BEDSIDE PROVIDING GREAT EMOTIONAL SUPPORT.

## 2018-09-16 NOTE — NUR
[facility]/LVN

PT COMPLAINED OF PAIN, RATED 7/10. PT WAS GIVEN NORCO PO FOR THIS. WILL CONTINUE TO MONITOR 
THIS PTS PAIN.

## 2018-09-16 NOTE — NUR
RN [facility] INITIAL NOTES



RECEIVED PATIENT FROM PM NURSE, PATEINT RESTING IN BED WITH FAMILY AT BEDSIDE, A&O X4 
ENGLISH MOUTHS WORDS, VENT TRACH SAT ABOVE 97% NO SOB OR ACUTE DISTRESS NOTED, HOB ELEVATED, 
ON TELE MON SINUS RHTYME HEART RATE 88, MASON CATH DRAINING URINE VIA GRAVITY, RIGHT HAND 18 
G, RIGHT FOREARM 20 G IV INTACT AND PATENT NO INFILTRATION NOTED, ALL SAFETY MEASURES 
INITIATED, [employer]CI MATTRESS, TURN AND REPO, OFFLOAD, WILL CONTINUE TO MONITOR.

## 2018-09-16 NOTE — NUR
[facility]/LVN

PT WAS TURNED AND REPOSITIONED AFTER BATH AND WOUND PHOTO DOCUMENTATION DONE. PT TOLERATED 
THIS WELL.

## 2018-09-16 NOTE — NUR
deflated pt cuff per pt request. RN notified. no disrtress/ sob/ adverse effects noted. will 
cont. to monitor pt.

## 2018-09-16 NOTE — NUR
JOVON GOOD NOTES



PATEINT FEELING ANXIOUS SPOKE WITH DR SALEH FOR ANY NEW ORDERS AND STATED NO NEW ORDERS GIVE 
SEROQUEL AS MD ORDERED AND ASK PSYCH AND ALSO TELL RESP THERAPIST TO TAPE DOWN FIO2 TO KEEP 
O2 AT 90%, INFORMED PATIENT NO NEW ORDERS FROM DOCTOR, WILL CONTINUE TO MONITOR.

-------------------------------------------------------------------------------

Addendum: 09/16/18 at 1119 by AURELIO BLAKE RN

-------------------------------------------------------------------------------

INFORMED RT ZAHIDA AS WELL

## 2018-09-16 NOTE — NUR
DEFLATED CUFF PER PT REQUEST. NO DISTRESS OR SOB NOTED. RN AWARE AND AT BEDSIDE. WILL CONT. 
TO MONITOR PT.

## 2018-09-16 NOTE — NUR
RT NOTES



END OF SHIFT. NO DISTRESS NOTED. NO SOB. VENT WELL FUNCTIONING. NO CHANGES IN PT STATUS ASS 
SHIFT. WILL GIVE REPORT TO NOC SHIFT. CUFF CURRENTLY INFLATED.

## 2018-09-17 VITALS — SYSTOLIC BLOOD PRESSURE: 107 MMHG | DIASTOLIC BLOOD PRESSURE: 68 MMHG

## 2018-09-17 VITALS — DIASTOLIC BLOOD PRESSURE: 85 MMHG | SYSTOLIC BLOOD PRESSURE: 146 MMHG

## 2018-09-17 VITALS — DIASTOLIC BLOOD PRESSURE: 65 MMHG | SYSTOLIC BLOOD PRESSURE: 105 MMHG

## 2018-09-17 VITALS — DIASTOLIC BLOOD PRESSURE: 68 MMHG | SYSTOLIC BLOOD PRESSURE: 109 MMHG

## 2018-09-17 VITALS — SYSTOLIC BLOOD PRESSURE: 146 MMHG | DIASTOLIC BLOOD PRESSURE: 85 MMHG

## 2018-09-17 VITALS — DIASTOLIC BLOOD PRESSURE: 87 MMHG | SYSTOLIC BLOOD PRESSURE: 165 MMHG

## 2018-09-17 VITALS — SYSTOLIC BLOOD PRESSURE: 91 MMHG | DIASTOLIC BLOOD PRESSURE: 53 MMHG

## 2018-09-17 VITALS — DIASTOLIC BLOOD PRESSURE: 67 MMHG | SYSTOLIC BLOOD PRESSURE: 123 MMHG

## 2018-09-17 VITALS — SYSTOLIC BLOOD PRESSURE: 115 MMHG | DIASTOLIC BLOOD PRESSURE: 70 MMHG

## 2018-09-17 RX ADMIN — ALBUTEROL SULFATE SCH MG: 2.5 SOLUTION RESPIRATORY (INHALATION) at 02:23

## 2018-09-17 RX ADMIN — CEFEPIME HYDROCHLORIDE SCH MLS/HR: 1 INJECTION, POWDER, FOR SOLUTION INTRAMUSCULAR; INTRAVENOUS at 12:22

## 2018-09-17 RX ADMIN — ACETYLCYSTEINE SCH MG: 100 INHALANT RESPIRATORY (INHALATION) at 07:59

## 2018-09-17 RX ADMIN — ACETAMINOPHEN PRN MG: 160 SOLUTION ORAL at 09:53

## 2018-09-17 RX ADMIN — ALBUTEROL SULFATE SCH MG: 2.5 SOLUTION RESPIRATORY (INHALATION) at 13:30

## 2018-09-17 RX ADMIN — QUETIAPINE SCH MG: 25 TABLET, FILM COATED ORAL at 12:21

## 2018-09-17 RX ADMIN — CEFEPIME HYDROCHLORIDE SCH MLS/HR: 1 INJECTION, POWDER, FOR SOLUTION INTRAMUSCULAR; INTRAVENOUS at 20:02

## 2018-09-17 RX ADMIN — BUDESONIDE SCH MG: 0.25 SUSPENSION RESPIRATORY (INHALATION) at 21:37

## 2018-09-17 RX ADMIN — OXYCODONE HYDROCHLORIDE AND ACETAMINOPHEN SCH MG: 500 TABLET ORAL at 08:43

## 2018-09-17 RX ADMIN — OXYCODONE HYDROCHLORIDE AND ACETAMINOPHEN SCH MG: 500 TABLET ORAL at 16:46

## 2018-09-17 RX ADMIN — Medication SCH MG: at 08:44

## 2018-09-17 RX ADMIN — QUETIAPINE SCH MG: 25 TABLET, FILM COATED ORAL at 16:47

## 2018-09-17 RX ADMIN — ALBUTEROL SULFATE SCH MG: 2.5 SOLUTION RESPIRATORY (INHALATION) at 19:54

## 2018-09-17 RX ADMIN — Medication PRN EACH: at 08:45

## 2018-09-17 RX ADMIN — Medication SCH MG: at 08:43

## 2018-09-17 RX ADMIN — BUDESONIDE SCH MG: 0.25 SUSPENSION RESPIRATORY (INHALATION) at 07:59

## 2018-09-17 RX ADMIN — TRAZODONE HYDROCHLORIDE SCH MG: 50 TABLET ORAL at 21:58

## 2018-09-17 RX ADMIN — Medication PRN EACH: at 19:44

## 2018-09-17 RX ADMIN — ALBUTEROL SULFATE SCH MG: 2.5 SOLUTION RESPIRATORY (INHALATION) at 07:59

## 2018-09-17 RX ADMIN — THERA TABS SCH UDTAB: TAB at 08:44

## 2018-09-17 RX ADMIN — Medication PRN EACH: at 01:27

## 2018-09-17 RX ADMIN — Medication SCH APPLIC: at 08:45

## 2018-09-17 RX ADMIN — Medication SCH MG: at 16:46

## 2018-09-17 RX ADMIN — QUETIAPINE SCH MG: 25 TABLET, FILM COATED ORAL at 20:02

## 2018-09-17 RX ADMIN — Medication SCH EACH: at 16:46

## 2018-09-17 RX ADMIN — MIRTAZAPINE SCH MG: 15 TABLET, FILM COATED ORAL at 21:57

## 2018-09-17 RX ADMIN — QUETIAPINE FUMARATE SCH MG: 100 TABLET ORAL at 21:57

## 2018-09-17 RX ADMIN — Medication SCH EACH: at 08:49

## 2018-09-17 RX ADMIN — CEFEPIME HYDROCHLORIDE SCH MLS/HR: 1 INJECTION, POWDER, FOR SOLUTION INTRAMUSCULAR; INTRAVENOUS at 05:32

## 2018-09-17 RX ADMIN — ALBUTEROL SULFATE SCH MG: 2.5 SOLUTION RESPIRATORY (INHALATION) at 23:21

## 2018-09-17 RX ADMIN — QUETIAPINE SCH MG: 25 TABLET, FILM COATED ORAL at 08:44

## 2018-09-17 RX ADMIN — Medication PRN EACH: at 15:34

## 2018-09-17 RX ADMIN — Medication PRN EACH: at 11:13

## 2018-09-17 RX ADMIN — ACETYLCYSTEINE SCH MG: 100 INHALANT RESPIRATORY (INHALATION) at 23:22

## 2018-09-17 RX ADMIN — ENOXAPARIN SODIUM SCH MG: 40 INJECTION SUBCUTANEOUS at 20:02

## 2018-09-17 RX ADMIN — QUETIAPINE SCH MG: 25 TABLET, FILM COATED ORAL at 19:56

## 2018-09-17 NOTE — NUR
[facility]/LVN

PT APPEARS TO BE RESTING COMFORTABLY, NO ACUTE DISTRESS SEEN AT THIS TIME. SATURATION IS 
99%ON CURRENT VENT SETTINGS. WILL CONTINUE TO MONITOR THIS PT.

## 2018-09-17 NOTE — NUR
[facility]/LVN

PT COMPLAINED ABOUT PAIN, RATED 7/10. PT REQUESTED NORCO. NORCO 1 TAB GIVEN FOR THIS. WILL 
CONTINUE TO MONITOR THIS PT'S PAIN. FRIEND AT BEDSIDE, ATTENTIVE TO PT'S NEEDS.

## 2018-09-17 NOTE — NUR
PT. 43 Y OLD MALE REC. @ 0700 AM 

TRACHE'D MAIDALEY # 6 CUFFED 

@1725 RRT CALLED TO ROOM POST VENT CHECK AND X4 RT AT THE BEDSIDE. AFTER CLEANING THE PT. 
PT. DESATURATED AND FOUND PANIC. AMBU BAGGED PER PROTOCOL AND SATURATION CAME BACK UP CUFFED 
INFLATED PLACED BACK ON VENT TO KEEP ADEQUATE VOLUMES ON VENT  

ALL VITALS REMAIN NORMAL AND PT. KEEP ASKING RT TO REMAIN AT THE BEDSIDE. I STAYED AT THE 
BEDSIDE. FRIEND IS AT THE BEDSIDE. SEEMS LIKE PT. IS STIL IN PANIC AND SCARED. I STAYED TO 
COMFORT THE PT. UNTIL HE IS COMFORTABLE.  

[facility], ( PER DR. BEE AT THE BEDSIDE. KEEP CUFF 
DEFLATED ) UNTIL NIGHT TIME PUT SOME AIR FOR CUFF FOR PT. TO KEEP THE VOLUMES. WILL PASS TO 
PM SHIFT TO CHECK MLT AND CUFF PRESSURE AS MD ORDERED.



ALARMS ARE SET AND FUNCTIONAL, B/S CLEAR/DIM. RIVERA'X FOR MINIMAL AMT. OF WHITE SECRETIONS. 
TRACH CARE DONE. HME CHANGED EQUAL CHEST RISE NOTED. INLINE TX'S GIVEN NO ADVERSE REACTION 
NOTED. 

GILLES. WELL AND REMAIN ON SAME SETTINGS, VENT PLUGGED INTO RED OUT LET. NO CHANGES T/O DAY

CONTINUE FOR CARE AND MONITOR. AMBU BAG REMAIN AT THE BEDSIDE,.

REPORT WILL PASS TO PM SHIFT.

-------------------------------------------------------------------------------

Addendum: 09/17/18 at 1756 by ROMÁN GRIMES RT

-------------------------------------------------------------------------------

Amended: Links added.

## 2018-09-17 NOTE — NUR
RN NOTE:



1715: DURING EVENING CARE, PATIENT WAS HAVING BOWEL MOVEMENT NOTICE RESP. DISTRESS SPO2 85%, 
HR DOWN TO 38, RAISED PATIENT HOB, 100% O2 WAS GIVEN. PATIENT MOUTH FEELING OK. RESUMED 
PATIENT CARE, PERINEAL CARE DONE AND AGAIN SECOND EPISODE OF DESATURATION NOTED, SPO2 78%, 
hr 39, CALLED RAPID RESPONSE AT 1725.



RRT NURSE, RT FR[place], CHARGE RN AT BEDSIDE. CRASH CART BROUGHT INTO ROOM. VITAL SIGNS CHECKED, 
/85. HOB ELEVATED. 



1734: PATIENT SUCTIONED. BS CHECKED, DR. SALEH MADE AWARE. NO NEW ORDERS. HR 60S, SPO2 99%. 
PATIENT ABLE TO MOUTH FEELING BETTER. 



SAFETY MEASURES OBSERVED. CALL LIGHT WITHIN REACH. WILL CONTINUE TO MONITOR.

## 2018-09-17 NOTE — NUR
RT NOTE. 

POST RRT CALLED PT. REMAIN SCARED AND PANIC VITALS REMAIN STABLE. PT. IS REQUESTING 
PSYCHIATRIST TO CONSULT. 

FRIEND IS STILL AT THE BEDSIDE,. RN NOTIFIED AND CONTINUE TO HELP HIM. 

CUFFED REMAIN INFLATED FOR KEEP SATURATION AND VOLUME ADEQUATE.FIO2 BACK TO 40% PER DR. SALEH. TO NOT KEEP OVER 40% 

SPO2 93% AND CHARGE RN INFORMED.

## 2018-09-17 NOTE — NUR
PT RECEIVED TRACH WITH A SHILEY 6  ON THE VENT WITH NOTED SETTINGS. PT IS AWAKE AND ALERT.  
VENT ALARMS ARE SET AND AUDIBLE WITH AMBU BAG BY BEDSIDE.VENT IS PLUGGED INTO RED OUTLET. 
BREATHING TX GIVEN  AS ORDERED. NO ADVERSE REACTIONS NOTED. SX'D MODERATE THICK YELLOW  
SECRETIONS WITH BLOOD TINGED.. NO RESPIRATORY DISTRESS NOTED AT THIS TIME, WILL CONTINUE TO 
MONITOR.

----------------------------------

-------------------------------------------------------------------------------

Addendum: 09/18/18 at 0404 by JORDANA CANNON RT

-------------------------------------------------------------------------------

RCVD PT AC 16,500,40%,PEEP 5

## 2018-09-17 NOTE — NUR
TELE/RN NOTES:



RECEIVED PT. IN BED W/ HOB ELEVATED W/ FATHER AT BEDSIDE. ON CONTACT ISOLATION. ON 
ASPIRATION PRECAUTION. MECHANICAL VENT TOLERATING VENT SETTINGS WELL. TURNED AND 
REPOSITIONED AS PER PT. REQUEST. WILL CONTINUE TO MONITOR.

## 2018-09-18 VITALS — SYSTOLIC BLOOD PRESSURE: 124 MMHG | DIASTOLIC BLOOD PRESSURE: 70 MMHG

## 2018-09-18 VITALS — DIASTOLIC BLOOD PRESSURE: 77 MMHG | SYSTOLIC BLOOD PRESSURE: 133 MMHG

## 2018-09-18 VITALS — SYSTOLIC BLOOD PRESSURE: 131 MMHG | DIASTOLIC BLOOD PRESSURE: 59 MMHG

## 2018-09-18 VITALS — DIASTOLIC BLOOD PRESSURE: 59 MMHG | SYSTOLIC BLOOD PRESSURE: 131 MMHG

## 2018-09-18 VITALS — DIASTOLIC BLOOD PRESSURE: 70 MMHG | SYSTOLIC BLOOD PRESSURE: 117 MMHG

## 2018-09-18 VITALS — SYSTOLIC BLOOD PRESSURE: 103 MMHG | DIASTOLIC BLOOD PRESSURE: 62 MMHG

## 2018-09-18 VITALS — DIASTOLIC BLOOD PRESSURE: 52 MMHG | SYSTOLIC BLOOD PRESSURE: 106 MMHG

## 2018-09-18 VITALS — DIASTOLIC BLOOD PRESSURE: 70 MMHG | SYSTOLIC BLOOD PRESSURE: 124 MMHG

## 2018-09-18 RX ADMIN — Medication SCH APPLIC: at 09:15

## 2018-09-18 RX ADMIN — ALBUTEROL SULFATE SCH MG: 2.5 SOLUTION RESPIRATORY (INHALATION) at 20:06

## 2018-09-18 RX ADMIN — QUETIAPINE PRN MG: 25 TABLET, FILM COATED ORAL at 03:41

## 2018-09-18 RX ADMIN — ALBUTEROL SULFATE SCH MG: 2.5 SOLUTION RESPIRATORY (INHALATION) at 07:54

## 2018-09-18 RX ADMIN — QUETIAPINE SCH MG: 25 TABLET, FILM COATED ORAL at 20:06

## 2018-09-18 RX ADMIN — QUETIAPINE PRN MG: 25 TABLET, FILM COATED ORAL at 02:01

## 2018-09-18 RX ADMIN — BUDESONIDE SCH MG: 0.25 SUSPENSION RESPIRATORY (INHALATION) at 20:06

## 2018-09-18 RX ADMIN — TRAMADOL HYDROCHLORIDE PRN MG: 50 TABLET, FILM COATED ORAL at 03:47

## 2018-09-18 RX ADMIN — Medication PRN EACH: at 10:34

## 2018-09-18 RX ADMIN — QUETIAPINE SCH MG: 25 TABLET, FILM COATED ORAL at 15:59

## 2018-09-18 RX ADMIN — Medication SCH MG: at 08:52

## 2018-09-18 RX ADMIN — Medication PRN EACH: at 14:33

## 2018-09-18 RX ADMIN — CEFEPIME HYDROCHLORIDE SCH MLS/HR: 1 INJECTION, POWDER, FOR SOLUTION INTRAMUSCULAR; INTRAVENOUS at 20:07

## 2018-09-18 RX ADMIN — ACETYLCYSTEINE SCH MG: 100 INHALANT RESPIRATORY (INHALATION) at 07:55

## 2018-09-18 RX ADMIN — OXYCODONE HYDROCHLORIDE AND ACETAMINOPHEN SCH MG: 500 TABLET ORAL at 08:52

## 2018-09-18 RX ADMIN — OXYCODONE HYDROCHLORIDE AND ACETAMINOPHEN SCH MG: 500 TABLET ORAL at 16:34

## 2018-09-18 RX ADMIN — ALBUTEROL SULFATE SCH MG: 2.5 SOLUTION RESPIRATORY (INHALATION) at 03:40

## 2018-09-18 RX ADMIN — ALBUTEROL SULFATE SCH MG: 2.5 SOLUTION RESPIRATORY (INHALATION) at 11:11

## 2018-09-18 RX ADMIN — TRAZODONE HYDROCHLORIDE SCH MG: 50 TABLET ORAL at 21:41

## 2018-09-18 RX ADMIN — Medication PRN EACH: at 06:20

## 2018-09-18 RX ADMIN — Medication SCH EACH: at 16:34

## 2018-09-18 RX ADMIN — BUDESONIDE SCH MG: 0.25 SUSPENSION RESPIRATORY (INHALATION) at 07:56

## 2018-09-18 RX ADMIN — ENOXAPARIN SODIUM SCH MG: 40 INJECTION SUBCUTANEOUS at 20:15

## 2018-09-18 RX ADMIN — TRAMADOL HYDROCHLORIDE PRN MG: 50 TABLET, FILM COATED ORAL at 18:57

## 2018-09-18 RX ADMIN — ALBUTEROL SULFATE SCH MG: 2.5 SOLUTION RESPIRATORY (INHALATION) at 23:30

## 2018-09-18 RX ADMIN — TRAMADOL HYDROCHLORIDE PRN MG: 50 TABLET, FILM COATED ORAL at 13:05

## 2018-09-18 RX ADMIN — TRAMADOL HYDROCHLORIDE PRN MG: 50 TABLET, FILM COATED ORAL at 09:10

## 2018-09-18 RX ADMIN — THERA TABS SCH UDTAB: TAB at 08:53

## 2018-09-18 RX ADMIN — Medication SCH EACH: at 08:53

## 2018-09-18 RX ADMIN — Medication SCH MG: at 08:53

## 2018-09-18 RX ADMIN — Medication SCH MG: at 16:34

## 2018-09-18 RX ADMIN — CEFEPIME HYDROCHLORIDE SCH MLS/HR: 1 INJECTION, POWDER, FOR SOLUTION INTRAMUSCULAR; INTRAVENOUS at 12:03

## 2018-09-18 RX ADMIN — MIRTAZAPINE SCH MG: 15 TABLET, FILM COATED ORAL at 21:41

## 2018-09-18 RX ADMIN — CEFEPIME HYDROCHLORIDE SCH MLS/HR: 1 INJECTION, POWDER, FOR SOLUTION INTRAMUSCULAR; INTRAVENOUS at 04:02

## 2018-09-18 RX ADMIN — Medication PRN EACH: at 01:01

## 2018-09-18 RX ADMIN — ACETYLCYSTEINE SCH MG: 100 INHALANT RESPIRATORY (INHALATION) at 20:06

## 2018-09-18 RX ADMIN — ALBUTEROL SULFATE SCH MG: 2.5 SOLUTION RESPIRATORY (INHALATION) at 15:40

## 2018-09-18 RX ADMIN — QUETIAPINE PRN MG: 25 TABLET, FILM COATED ORAL at 10:04

## 2018-09-18 RX ADMIN — QUETIAPINE FUMARATE SCH MG: 100 TABLET ORAL at 21:41

## 2018-09-18 RX ADMIN — TRAMADOL HYDROCHLORIDE PRN MG: 50 TABLET, FILM COATED ORAL at 02:01

## 2018-09-18 RX ADMIN — QUETIAPINE SCH MG: 25 TABLET, FILM COATED ORAL at 12:03

## 2018-09-18 RX ADMIN — TRAMADOL HYDROCHLORIDE PRN MG: 50 TABLET, FILM COATED ORAL at 08:54

## 2018-09-18 NOTE — NUR
TELE RN ENDING NOTES

PT IS STABLE. LAST PAIN MEDS GIVEN: TRAMADOL AT 1900. FAMILY AT BS. NOT IN RESPIRATORY 
DISTRESS. SAFETY MEASURES PROVIDED. CALL LIGHT IN REACH. ALL NEEDS ATTENDED TO. WILL ENDORSE 
TO PM NURSE FOR CONTINUED CARE.

## 2018-09-18 NOTE — NUR
TELE RN INITIAL NOTES

PATIENT IN BED A/O X4. TRACH TO VENT SETTINGS AS MD ORDERED. NOT IN RESP DISTRESS. DAD AT 
BS. REPORTED THAT PT HAS "HEART FLUTTERS" WHEN HIS . NOTIFIED DR SALEH. PER DR SALEH PT 
HAS ANXIETY. IV SITE NO S/SX INFECTION. FLUSHED/PATENT. ON TELE SR 75. NO EDEMA BLE. F/C 
DRAINING 200CC URINE, CLEAR YELLOW. LUNG SOUNDS CLEAR. RT AT BS. RT SUCTIONED AND DEFLATED 
BALLOON FOR BREAKFAST. DAD SAID HE WOULD FEED. REPOSITIONED PT. PT HAS NO PAIN AT THIS TIME. 
SAFETY MEASURES IN PLACE. WILL CONT TO MONITOR.

## 2018-09-18 NOTE — NUR
TELE RN NOTES

PT REQUESTED RT AT BS WHILE DOING BEDBATH. PT IS ANXIOUS DURING POSITION CHANGES/BED BATHS.

## 2018-09-18 NOTE — NUR
TELE 1 RN NOTE

PT IS AWAKE AND C/O PAIN ALL OVER 7/10 ULTRAM 50 MG PO GIVEN ALSO GIVEN SEROQUEL 12.5 MG PER 
PT'S REQUEST TO CALM HIM DOWN. 

-------------------------------------------------------------------------------

Addendum: 09/18/18 at 0207 by NOLAN FOREMAN RN

-------------------------------------------------------------------------------

TOOK MEDS TO THE PT BUT PT CHANGE HIS MIND AND FALL ASLEEP. UNABLE TO GIVE ANY MEDS AT THIS 
TIME.

## 2018-09-18 NOTE — NUR
PT ON VENT GILLES WELL WITH NO RESPIRATORY DISTRESS NOTED ATT. PT HAS A TRACH WHICH IS INTACT 
SECURE AND PATENT. BREATH SOUNDS COURSE SX PRN RETURNING SMALL TO MODERATE PALE YELLOW 
SECRETIONS. VENT ALARMS CHECKED FOUND TO BE FUNCTIONAL, AUDIBLE, AND WITHIN LIMITS. VENT 
PLUGGED INTO RED OUTLET. MLT DONE, BREATHING TX AS MD ORDERED GILLES WELL WITH NO RESPIRATORY 
DISTRESS NOTED. BVM AT BEDSIDE.

## 2018-09-18 NOTE — NUR
TELE RN INITIAL NOTES

PATIENT IN BED ASLEEP BUT AROUSABLE. TRACH TO COOL AEROSOL FIO2 28%. PT IS ON TELE READING 
SR 65. IV SITE NO S/SX OF INFECTION WITH IV FLUIDS RUNNING. GT SITE IS RED AND LEAKING 
YELLOW FLUID AND SURROUNDING WOUND TENDER TO TOUCH AND RED. SAFETY PRECAUTIONS MAINTAINED. 
BED IN LOCKED/LOWEST POSITION. CALL LIGHT IN REACH. WILL CONT TO MONITOR.

## 2018-09-18 NOTE — NUR
TELE RN NOTES:



RECEIVED PT ON BED ALERT AND AWAKE. ABLE TO MAKE NEEDS KNOWN. FAMILY MEMBER AT BEDSIDE. NO 
ACUTE DISTRESS NOTED. NO COMPLAINTS OF PAIN OR DISCOMFORT AT THIS TIME. ON MECH VENT, 
SATURATING WELL. NO SOB NOTED. IV ON RIGHT ARM #20 INTACT AND PATENT, FLUSHING WELL. MASON 
CATH INTACT AND DRAINING WELL. HEAD OF BED KEPT ELEVATED. TURNED AND REPOSITIONED PT WITH RT 
ON BEDSIDE. CALL LIGHT PLACED WITHIN REACH. KEPT CLEAN, DRY AND COMFORTABLE. WILL CONTINUE 
TO MONITOR PT.

## 2018-09-19 VITALS — SYSTOLIC BLOOD PRESSURE: 108 MMHG | DIASTOLIC BLOOD PRESSURE: 64 MMHG

## 2018-09-19 VITALS — DIASTOLIC BLOOD PRESSURE: 62 MMHG | SYSTOLIC BLOOD PRESSURE: 103 MMHG

## 2018-09-19 VITALS — DIASTOLIC BLOOD PRESSURE: 72 MMHG | SYSTOLIC BLOOD PRESSURE: 124 MMHG

## 2018-09-19 VITALS — DIASTOLIC BLOOD PRESSURE: 67 MMHG | SYSTOLIC BLOOD PRESSURE: 124 MMHG

## 2018-09-19 VITALS — SYSTOLIC BLOOD PRESSURE: 120 MMHG | DIASTOLIC BLOOD PRESSURE: 66 MMHG

## 2018-09-19 VITALS — SYSTOLIC BLOOD PRESSURE: 118 MMHG | DIASTOLIC BLOOD PRESSURE: 74 MMHG

## 2018-09-19 LAB
BASOPHILS # BLD AUTO: 0 /CMM (ref 0–0.2)
BASOPHILS NFR BLD AUTO: 0.1 % (ref 0–2)
BUN SERPL-MCNC: 11 MG/DL (ref 7–18)
CALCIUM SERPL-MCNC: 9 MG/DL (ref 8.5–10.1)
CHLORIDE SERPL-SCNC: 102 MMOL/L (ref 98–107)
CO2 SERPL-SCNC: 33 MMOL/L (ref 21–32)
CREAT SERPL-MCNC: 0.3 MG/DL (ref 0.6–1.3)
EOSINOPHIL NFR BLD AUTO: 3.2 % (ref 0–6)
GLUCOSE SERPL-MCNC: 109 MG/DL (ref 74–106)
HCT VFR BLD AUTO: 36 % (ref 39–51)
HGB BLD-MCNC: 11.5 G/DL (ref 13.5–17.5)
LYMPHOCYTES NFR BLD AUTO: 1 /CMM (ref 0.8–4.8)
LYMPHOCYTES NFR BLD AUTO: 13.3 % (ref 20–44)
MCH RBC QN AUTO: 27 PG (ref 26–33)
MCHC RBC AUTO-ENTMCNC: 32 G/DL (ref 31–36)
MCV RBC AUTO: 85 FL (ref 80–96)
MONOCYTES NFR BLD AUTO: 0.7 /CMM (ref 0.1–1.3)
MONOCYTES NFR BLD AUTO: 9.3 % (ref 2–12)
NEUTROPHILS # BLD AUTO: 5.7 /CMM (ref 1.8–8.9)
NEUTROPHILS NFR BLD AUTO: 74.1 % (ref 43–81)
PLATELET # BLD AUTO: 337 /CMM (ref 150–450)
POTASSIUM SERPL-SCNC: 3.6 MMOL/L (ref 3.5–5.1)
RBC # BLD AUTO: 4.19 MIL/UL (ref 4.5–6)
RDW COEFFICIENT OF VARIATION: 14 (ref 11.5–15)
SODIUM SERPL-SCNC: 140 MMOL/L (ref 136–145)
WBC NRBC COR # BLD AUTO: 7.6 K/UL (ref 4.3–11)

## 2018-09-19 RX ADMIN — TRAMADOL HYDROCHLORIDE PRN MG: 50 TABLET, FILM COATED ORAL at 11:42

## 2018-09-19 RX ADMIN — TRAMADOL HYDROCHLORIDE PRN MG: 50 TABLET, FILM COATED ORAL at 16:16

## 2018-09-19 RX ADMIN — Medication PRN EACH: at 16:03

## 2018-09-19 RX ADMIN — OXYCODONE HYDROCHLORIDE AND ACETAMINOPHEN SCH MG: 500 TABLET ORAL at 08:13

## 2018-09-19 RX ADMIN — Medication PRN EACH: at 05:09

## 2018-09-19 RX ADMIN — CEFEPIME HYDROCHLORIDE SCH MLS/HR: 1 INJECTION, POWDER, FOR SOLUTION INTRAMUSCULAR; INTRAVENOUS at 05:00

## 2018-09-19 RX ADMIN — Medication SCH MG: at 08:13

## 2018-09-19 RX ADMIN — QUETIAPINE SCH MG: 25 TABLET, FILM COATED ORAL at 08:13

## 2018-09-19 RX ADMIN — QUETIAPINE SCH MG: 25 TABLET, FILM COATED ORAL at 11:42

## 2018-09-19 RX ADMIN — QUETIAPINE SCH MG: 25 TABLET, FILM COATED ORAL at 15:56

## 2018-09-19 RX ADMIN — THERA TABS SCH UDTAB: TAB at 08:13

## 2018-09-19 RX ADMIN — ALBUTEROL SULFATE SCH MG: 2.5 SOLUTION RESPIRATORY (INHALATION) at 04:27

## 2018-09-19 RX ADMIN — Medication SCH APPLIC: at 08:15

## 2018-09-19 RX ADMIN — BUDESONIDE SCH MG: 0.25 SUSPENSION RESPIRATORY (INHALATION) at 09:19

## 2018-09-19 RX ADMIN — ALBUTEROL SULFATE SCH MG: 2.5 SOLUTION RESPIRATORY (INHALATION) at 07:51

## 2018-09-19 RX ADMIN — Medication PRN EACH: at 00:25

## 2018-09-19 RX ADMIN — CEFEPIME HYDROCHLORIDE SCH MLS/HR: 1 INJECTION, POWDER, FOR SOLUTION INTRAMUSCULAR; INTRAVENOUS at 12:00

## 2018-09-19 RX ADMIN — Medication SCH EACH: at 08:13

## 2018-09-19 RX ADMIN — Medication PRN EACH: at 08:25

## 2018-09-19 RX ADMIN — ACETYLCYSTEINE SCH MG: 100 INHALANT RESPIRATORY (INHALATION) at 11:14

## 2018-09-19 RX ADMIN — ALBUTEROL SULFATE SCH MG: 2.5 SOLUTION RESPIRATORY (INHALATION) at 11:14

## 2018-09-19 NOTE — NUR
RN NOTES 

REPORT GIVEN TO EMT, PT LEFT THE FLOOR TO SNF ACCOMPANIED BY EMT PERSONNEL IN STABLE 
CONDITION .

## 2018-09-19 NOTE — NUR
RT



RECEIVED PT TRACH VENT DEPENDENT WITH NOTED SETTINGS. TRACH IS INTACT AND SECURE. PT AWAKE 
AND ALERT. PT TOLERATING SETTINGS WELL. VENT PLUGGED IN RED OUTLET. AMBU BAG NOTED HOB. SX 
WITH SML CLEAR SECRETIONS. PT TOLERATING SETTINGS WELL, NO SOB OR RESP DISTRESS NOTED. WILL 
CONTINUE TO MONITOR T/O SHIFT.

## 2018-09-19 NOTE — NUR
RN NOTES:

RECEIVED PT ON BED ALERT AND AWAKE. ABLE TO MAKE NEEDS KNOWN. VENT/ TRACH DEPENDENT, NO SOB 
NOTED, TOLERATING  CURRENT VENT SETTING WELL, ON TELE SB-SR , HR IN 50'S , RIGHT FA IV SITE 
G 20 AND LEFT WRIST G 22 IV SITE  , CLEAN AND DRY AND INTACT,MASON DRINING TO GRAVITY WELL, 
HOB ELEVATED, SR UP x3, BED LOCKED AND IN LOWEST POSITION, CONTINUE TO MONITOR.

## 2018-09-19 NOTE — NUR
TELE RN NOTES:



NO CHANGES NOTED THROUGHOUT THE SHIFT. PT SLEPT WELL. FAMILY AT BEDSIDE. DENIES PAIN AND 
DISCOMFORT AT THIS TIME. NO SOB NOTED, PT SATURATING WELL. SUCTIONED AS NEEDED. SINUS RHYTHM 
ON TELE MONITOR HR 69 BPM. ALL DUE MEDS GIVEN AS ORDERED AND WELL TOLERATED. MASON CATH 
INTACT, DRAINED 450CC OF URINE OUTPUT. KEPT CLEAN, DRY AND COMFORTABLE. SAFETY AND FALL 
PRECAUTIONS OBSERVED AND MAINTAINED. WILL ENDORSE TO DAY SHIFT NURSE FOR CONTINUITY OF CARE.

## 2018-09-26 ENCOUNTER — HOSPITAL ENCOUNTER (INPATIENT)
Dept: HOSPITAL 54 - ER | Age: 43
LOS: 4 days | Discharge: NURSING, REHAB OR LONG-TERM CARE | DRG: 951 | End: 2018-09-30
Attending: INTERNAL MEDICINE | Admitting: INTERNAL MEDICINE
Payer: MEDICAID

## 2018-09-26 VITALS — BODY MASS INDEX: 20.95 KG/M2 | HEIGHT: 76 IN | WEIGHT: 172 LBS

## 2018-09-26 VITALS — SYSTOLIC BLOOD PRESSURE: 91 MMHG | DIASTOLIC BLOOD PRESSURE: 51 MMHG

## 2018-09-26 VITALS — DIASTOLIC BLOOD PRESSURE: 57 MMHG | SYSTOLIC BLOOD PRESSURE: 102 MMHG

## 2018-09-26 VITALS — DIASTOLIC BLOOD PRESSURE: 60 MMHG | SYSTOLIC BLOOD PRESSURE: 108 MMHG

## 2018-09-26 VITALS — DIASTOLIC BLOOD PRESSURE: 65 MMHG | SYSTOLIC BLOOD PRESSURE: 111 MMHG

## 2018-09-26 DIAGNOSIS — L89.154: ICD-10-CM

## 2018-09-26 DIAGNOSIS — F32.2: ICD-10-CM

## 2018-09-26 DIAGNOSIS — J98.11: ICD-10-CM

## 2018-09-26 DIAGNOSIS — J96.20: ICD-10-CM

## 2018-09-26 DIAGNOSIS — F41.9: ICD-10-CM

## 2018-09-26 DIAGNOSIS — Z93.0: ICD-10-CM

## 2018-09-26 DIAGNOSIS — E78.5: ICD-10-CM

## 2018-09-26 DIAGNOSIS — J18.9: Primary | ICD-10-CM

## 2018-09-26 DIAGNOSIS — G82.50: ICD-10-CM

## 2018-09-26 DIAGNOSIS — L89.314: ICD-10-CM

## 2018-09-26 DIAGNOSIS — I10: ICD-10-CM

## 2018-09-26 DIAGNOSIS — Z99.11: ICD-10-CM

## 2018-09-26 DIAGNOSIS — L89.324: ICD-10-CM

## 2018-09-26 LAB
ALBUMIN SERPL BCP-MCNC: 2.2 G/DL (ref 3.4–5)
ALP SERPL-CCNC: 109 U/L (ref 46–116)
ALT SERPL W P-5'-P-CCNC: 60 U/L (ref 12–78)
APTT PPP: 27 SEC (ref 23–34)
AST SERPL W P-5'-P-CCNC: 28 U/L (ref 15–37)
BASOPHILS # BLD AUTO: 0 /CMM (ref 0–0.2)
BASOPHILS NFR BLD AUTO: 0.2 % (ref 0–2)
BILIRUB DIRECT SERPL-MCNC: 0.2 MG/DL (ref 0–0.2)
BILIRUB SERPL-MCNC: 0.6 MG/DL (ref 0.2–1)
BUN SERPL-MCNC: 10 MG/DL (ref 7–18)
CALCIUM SERPL-MCNC: 8.5 MG/DL (ref 8.5–10.1)
CHLORIDE SERPL-SCNC: 97 MMOL/L (ref 98–107)
CO2 SERPL-SCNC: 33 MMOL/L (ref 21–32)
CREAT SERPL-MCNC: 0.5 MG/DL (ref 0.6–1.3)
EOSINOPHIL NFR BLD AUTO: 1.3 % (ref 0–6)
GLUCOSE SERPL-MCNC: 119 MG/DL (ref 74–106)
HCT VFR BLD AUTO: 36 % (ref 39–51)
HGB BLD-MCNC: 11.6 G/DL (ref 13.5–17.5)
INR PPP: 1.04 (ref 0.85–1.15)
LYMPHOCYTES NFR BLD AUTO: 1.3 /CMM (ref 0.8–4.8)
LYMPHOCYTES NFR BLD AUTO: 10.2 % (ref 20–44)
MCHC RBC AUTO-ENTMCNC: 33 G/DL (ref 31–36)
MCV RBC AUTO: 83 FL (ref 80–96)
MONOCYTES NFR BLD AUTO: 1 /CMM (ref 0.1–1.3)
MONOCYTES NFR BLD AUTO: 8 % (ref 2–12)
NEUTROPHILS # BLD AUTO: 9.9 /CMM (ref 1.8–8.9)
NEUTROPHILS NFR BLD AUTO: 80.3 % (ref 43–81)
PH UR STRIP: 5.5 [PH] (ref 5–8)
PLATELET # BLD AUTO: 536 /CMM (ref 150–450)
POTASSIUM SERPL-SCNC: 3.7 MMOL/L (ref 3.5–5.1)
PROT SERPL-MCNC: 7.1 G/DL (ref 6.4–8.2)
PROT UR QL STRIP: 100 MG/DL
RBC # BLD AUTO: 4.3 MIL/UL (ref 4.5–6)
RBC #/AREA URNS HPF: (no result) /HPF (ref 0–2)
RDW COEFFICIENT OF VARIATION: 14.7 (ref 11.5–15)
SODIUM SERPL-SCNC: 134 MMOL/L (ref 136–145)
TROPONIN I SERPL-MCNC: < 0.017 NG/ML (ref 0–0.06)
UROBILINOGEN UR STRIP-MCNC: 1 EU/DL
WBC #/AREA URNS HPF: (no result) /HPF (ref 0–3)
WBC NRBC COR # BLD AUTO: 12.4 K/UL (ref 4.3–11)

## 2018-09-26 PROCEDURE — A4217 STERILE WATER/SALINE, 500 ML: HCPCS

## 2018-09-26 PROCEDURE — A6248 HYDROGEL DRSG GEL FILLER: HCPCS

## 2018-09-26 PROCEDURE — Z7610: HCPCS

## 2018-09-26 PROCEDURE — A6253 ABSORPT DRG > 48 SQ IN W/O B: HCPCS

## 2018-09-26 PROCEDURE — A6403 STERILE GAUZE>16 <= 48 SQ IN: HCPCS

## 2018-09-26 PROCEDURE — A4606 OXYGEN PROBE USED W OXIMETER: HCPCS

## 2018-09-26 PROCEDURE — 5A1955Z RESPIRATORY VENTILATION, GREATER THAN 96 CONSECUTIVE HOURS: ICD-10-PCS | Performed by: INTERNAL MEDICINE

## 2018-09-26 RX ADMIN — Medication SCH ML: at 18:00

## 2018-09-26 RX ADMIN — TRAZODONE HYDROCHLORIDE SCH MG: 50 TABLET ORAL at 22:04

## 2018-09-26 RX ADMIN — PIPERACILLIN SODIUM AND TAZOBACTAM SODIUM SCH MLS/HR: .375; 3 INJECTION, POWDER, LYOPHILIZED, FOR SOLUTION INTRAVENOUS at 21:46

## 2018-09-26 RX ADMIN — MIRTAZAPINE SCH MG: 15 TABLET, FILM COATED ORAL at 22:04

## 2018-09-26 RX ADMIN — ENOXAPARIN SODIUM SCH MG: 40 INJECTION SUBCUTANEOUS at 17:00

## 2018-09-26 RX ADMIN — DEXTROSE MONOHYDRATE SCH MLS/HR: 50 INJECTION, SOLUTION INTRAVENOUS at 19:41

## 2018-09-26 RX ADMIN — QUETIAPINE FUMARATE SCH MG: 100 TABLET ORAL at 22:04

## 2018-09-26 RX ADMIN — TRAMADOL HYDROCHLORIDE PRN MG: 50 TABLET, FILM COATED ORAL at 19:01

## 2018-09-26 RX ADMIN — SODIUM CHLORIDE, SODIUM LACTATE, POTASSIUM CHLORIDE, AND CALCIUM CHLORIDE PRN MLS/HR: .6; .31; .03; .02 INJECTION, SOLUTION INTRAVENOUS at 18:40

## 2018-09-26 NOTE — NUR
TELE RN NOTES

DUE VANCOMYCIN 1.25GM IV STARTED,INFUSING VIA IV PUMP AS ORDERED.REPOSITION TP LEFT SIDE.

## 2018-09-26 NOTE — NUR
PATIENT WAS RECEIVED ON CONTINUOUS ON NOTED VENT SETTINGS. PATIENT WAS ON PMV UPON ARRIVING 
AT THE ROOM WITH FAMILY MEMBERS INSIDE.RN AWARES,  VENT PLUGGED INTO RED OUTLET .AMBUBAG AND 
SPARE TRACH AT BEDSIDE . PATIENT STABLE AT THIS TIME . WILL CONTINUE TO MONITOR.

-------------------------------------------------------------------------------

Addendum: 09/26/18 at 2211 by NAYANA REDD RT

-------------------------------------------------------------------------------

Amended: Links added.

## 2018-09-26 NOTE — NUR
RN NOTES/MEDICATIONS

CALLED PHARMACY SPOKE TO [facility] AND ZOSYN NOT AVAILABLE PER PHARMACIST MEDICATION BEING 
SENT OKAY TO ADMINISTER WHEN IT ARRIVES

## 2018-09-26 NOTE — NUR
PT BROUGHT INTO ER BY EMT. PT ON MECHANICAL VENTILATION. PT RCVD TRACH'D. PT IS AWAKE AND 
ALERT. PT PLACED ON HOSPITAL VENTILATOR WITH NOTED SETTINGS BY TRANSPORT RT. SX DONE.  AMBU 
BAG AT BEDSIDE. VENT PLUGGED INTO RED OUTLET. ALARMS ARE ON AND AUDIBLE. NO RESPIRATORY 
DISTRESS NOTED AT THIS TIME. WE'LL CONTINUE TO MONITOR.

## 2018-09-26 NOTE — NUR
RT NOTE



LATE ENTRY. 

PER CONVERSATION WITH RN, RT DID NOT RECOMMEND TO ADMINISTER CRUSHED MEDICATION WITHOUT THE 
USE OF PMV.

## 2018-09-26 NOTE — NUR
RN NOTES

MD WITH ORDERS FOR OKAY TO HAVE PMV FOR EATING AND SPEAKING TO BE REMOVED IF NOT NEEDED, 
WILL ENDORSE TO NEXT SHIFT FOR CONTINUITY OF CARE

## 2018-09-26 NOTE — NUR
MS RN NOTES

RECEIVED ON TRACH TO VENT AC-16,TV-500,FIO1-35%,PEEP-5,SHILEY #8.A/O 
X4,BEDBOUND,QUADREPLEGIC,BOTH UPPER AND LOWER EXTREMITIES FLACCID.WITH MASON CATH IN PLACE 
FROM THE FACILITY,LUZ ROGERS POST ACUTE.WITH SALINE LOCK LEFT AC INTACT AND PATENT.NS WITH 
20MEQ INFUSING VIA IV PUMP AT 75ML/HR RATE.CALL LIGHT IN REACH,NEEDS ANTICIPATED.

## 2018-09-26 NOTE — NUR
RN OPENING/ ADMISSION NOTES

PATIENT AWAKE ALERT AND VERBALLY RESPONSIVE, ABLE TO MAKE NEEDS KNOWN. RESPIRATIONS EVEN AND 
UNLABORED, DENIES ANY PAIN OR DISCOMFORT AT THIS TIME. IV ACCESS TO LEFT AC 18G, RIGHT HAND 
22G PATENT AND INTACT, NO REDNESS OR INFILTRATION NOTED. MASON CATHETER IN PLACE DRAINING  
TEA COLORED URINE, REMAINS AFEBRILE, SAFETY MEASURES IN PLACE, KEPT CLEAN DRY AND 
COMFORTABLE, CALL LIGHT WITHIN EASY REACH, WILL CONTINUE TO MONITOR

## 2018-09-26 NOTE — NUR
RN CLOSING NOTES

PATIENT AWAKE ALERT AND VERBALLY RESPONSIVE, ABLE TO MAKE NEEDS KNOWN. RESPIRATIONS EVEN AND 
UNLABORED, DENIES ANY PAIN OR DISCOMFORT AT THIS TIME. ON VENT TOLERATING CURRENT SETTINGS 
WELL ,IV ACCESS TO LEFT AC 18G, RIGHT HAND 22G PATENT AND INTACT, IVF RUNNING AS ORDERED, NO 
REDNESS OR INFILTRATION NOTED. MASON CATHETER IN PLACE DRAINING  TEA COLORED URINE, REMAINS 
AFEBRILE, SAFETY MEASURES IN PLACE, KEPT CLEAN DRY AND COMFORTABLE, CALL LIGHT WITHIN EASY 
REACH, ENDORSED TO NEXT SHIFT FOR CONTINUITY OF CARE

## 2018-09-26 NOTE — NUR
RN NOTES/SKIN PICTURES

 PICTURE OF SACRAL WOUND TAKEN AND PLACED IN CHART, PICTURE NOT CLEAR AS MEMORY CARD NOT 
WORKING WILL ENDORSE TO NEXT SHIFT FOR BETTER PICTURE AS PT REFUSED TO BE CONTINUED TO BE 
MOVED AROUND

## 2018-09-26 NOTE — NUR
BIB [facility], SEND BY PMD FOR TACHYCARDIA AND FEVER, PER REPORT, ON 
ZOSYN FOR PNA. NAD NOTED, PT IS TRACH AND ON VENT. RT AT BS. PT WAS PUT ON 
MONITOR, MD AT BS.

## 2018-09-26 NOTE — NUR
RT NOTE



PT PLACED ON PMV PER MD ORDERS. SX DONE PRE AND POST. CUFF IS FULLY DEFLATED. NO RESPIRATORY 
DISTRESS OR SOB NOTED AT THIS TIME. JOVON MADRID NOTIFIED AND AWARE.

## 2018-09-26 NOTE — NUR
RN NOTES

PATIENT SEEN AND EXAMINED BY DR. SALEH WITH ORDERS, REVIEWED PT REQUEST FOR ANTI ANXIETY AND 
SLEEPING MEDICATIONS, AND REQUEST FOR FRIENDS TO STAY OVERNIGHT. PT WITH MULTIPLE REQUEST 
RELAYED TO MD WILL CONTINUE TO CARRY OUT ADMISSION ORDERS AND CONTINUE TO MONITOR.

## 2018-09-27 VITALS — SYSTOLIC BLOOD PRESSURE: 107 MMHG | DIASTOLIC BLOOD PRESSURE: 59 MMHG

## 2018-09-27 VITALS — DIASTOLIC BLOOD PRESSURE: 74 MMHG | SYSTOLIC BLOOD PRESSURE: 121 MMHG

## 2018-09-27 VITALS — SYSTOLIC BLOOD PRESSURE: 109 MMHG | DIASTOLIC BLOOD PRESSURE: 62 MMHG

## 2018-09-27 VITALS — SYSTOLIC BLOOD PRESSURE: 107 MMHG | DIASTOLIC BLOOD PRESSURE: 57 MMHG

## 2018-09-27 VITALS — DIASTOLIC BLOOD PRESSURE: 62 MMHG | SYSTOLIC BLOOD PRESSURE: 108 MMHG

## 2018-09-27 VITALS — DIASTOLIC BLOOD PRESSURE: 51 MMHG | SYSTOLIC BLOOD PRESSURE: 96 MMHG

## 2018-09-27 VITALS — DIASTOLIC BLOOD PRESSURE: 96 MMHG | SYSTOLIC BLOOD PRESSURE: 157 MMHG

## 2018-09-27 VITALS — DIASTOLIC BLOOD PRESSURE: 88 MMHG | SYSTOLIC BLOOD PRESSURE: 135 MMHG

## 2018-09-27 VITALS — SYSTOLIC BLOOD PRESSURE: 108 MMHG | DIASTOLIC BLOOD PRESSURE: 62 MMHG

## 2018-09-27 VITALS — DIASTOLIC BLOOD PRESSURE: 66 MMHG | SYSTOLIC BLOOD PRESSURE: 105 MMHG

## 2018-09-27 VITALS — DIASTOLIC BLOOD PRESSURE: 62 MMHG | SYSTOLIC BLOOD PRESSURE: 109 MMHG

## 2018-09-27 VITALS — DIASTOLIC BLOOD PRESSURE: 47 MMHG | SYSTOLIC BLOOD PRESSURE: 123 MMHG

## 2018-09-27 VITALS — SYSTOLIC BLOOD PRESSURE: 113 MMHG | DIASTOLIC BLOOD PRESSURE: 64 MMHG

## 2018-09-27 VITALS — DIASTOLIC BLOOD PRESSURE: 66 MMHG | SYSTOLIC BLOOD PRESSURE: 107 MMHG

## 2018-09-27 VITALS — SYSTOLIC BLOOD PRESSURE: 103 MMHG | DIASTOLIC BLOOD PRESSURE: 56 MMHG

## 2018-09-27 VITALS — SYSTOLIC BLOOD PRESSURE: 106 MMHG | DIASTOLIC BLOOD PRESSURE: 56 MMHG

## 2018-09-27 VITALS — DIASTOLIC BLOOD PRESSURE: 87 MMHG | SYSTOLIC BLOOD PRESSURE: 144 MMHG

## 2018-09-27 VITALS — DIASTOLIC BLOOD PRESSURE: 74 MMHG | SYSTOLIC BLOOD PRESSURE: 63 MMHG

## 2018-09-27 VITALS — SYSTOLIC BLOOD PRESSURE: 114 MMHG | DIASTOLIC BLOOD PRESSURE: 64 MMHG

## 2018-09-27 LAB
BASE EXCESS BLDA CALC-SCNC: 3.8 MMOL/L
BASOPHILS # BLD AUTO: 0 /CMM (ref 0–0.2)
BASOPHILS NFR BLD AUTO: 0.3 % (ref 0–2)
BUN SERPL-MCNC: 7 MG/DL (ref 7–18)
CALCIUM SERPL-MCNC: 8.5 MG/DL (ref 8.5–10.1)
CHLORIDE SERPL-SCNC: 99 MMOL/L (ref 98–107)
CO2 SERPL-SCNC: 30 MMOL/L (ref 21–32)
CREAT SERPL-MCNC: 0.3 MG/DL (ref 0.6–1.3)
DO-HGB MFR BLDA: 242.1 MMHG
EOSINOPHIL NFR BLD AUTO: 0.3 % (ref 0–6)
GLUCOSE SERPL-MCNC: 142 MG/DL (ref 74–106)
HCT VFR BLD AUTO: 31 % (ref 39–51)
HGB BLD-MCNC: 10.1 G/DL (ref 13.5–17.5)
INHALED O2 CONCENTRATION: 50 %
INTRINSIC PEEP RESPIRATORY: 5 CM H2O
LYMPHOCYTES NFR BLD AUTO: 0.7 /CMM (ref 0.8–4.8)
LYMPHOCYTES NFR BLD AUTO: 5.5 % (ref 20–44)
MCHC RBC AUTO-ENTMCNC: 32 G/DL (ref 31–36)
MCV RBC AUTO: 85 FL (ref 80–96)
MONOCYTES NFR BLD AUTO: 1 /CMM (ref 0.1–1.3)
MONOCYTES NFR BLD AUTO: 7.6 % (ref 2–12)
NEUTROPHILS # BLD AUTO: 11.4 /CMM (ref 1.8–8.9)
NEUTROPHILS NFR BLD AUTO: 86.3 % (ref 43–81)
PCO2 TEMP ADJ BLDA: 47.4 MMHG (ref 35–45)
PEEP SETTING VENT: 500 ML
PH TEMP ADJ BLDA: 7.41 [PH] (ref 7.35–7.45)
PLATELET # BLD AUTO: 368 /CMM (ref 150–450)
PO2 TEMP ADJ BLDA: 61.1 MMHG (ref 75–100)
POTASSIUM SERPL-SCNC: 3.8 MMOL/L (ref 3.5–5.1)
RBC # BLD AUTO: 3.69 MIL/UL (ref 4.5–6)
RDW COEFFICIENT OF VARIATION: 15.6 (ref 11.5–15)
SAO2 % BLDA: 90 % (ref 92–98.5)
SET RATE, BG: 16
SODIUM SERPL-SCNC: 134 MMOL/L (ref 136–145)
WBC NRBC COR # BLD AUTO: 13.2 K/UL (ref 4.3–11)

## 2018-09-27 PROCEDURE — 0KBP0ZZ EXCISION OF LEFT HIP MUSCLE, OPEN APPROACH: ICD-10-PCS | Performed by: SURGERY

## 2018-09-27 PROCEDURE — 0KBN0ZZ EXCISION OF RIGHT HIP MUSCLE, OPEN APPROACH: ICD-10-PCS | Performed by: SURGERY

## 2018-09-27 RX ADMIN — Medication SCH ML: at 09:00

## 2018-09-27 RX ADMIN — ACETYLCYSTEINE SCH MG: 100 INHALANT RESPIRATORY (INHALATION) at 12:37

## 2018-09-27 RX ADMIN — TRAZODONE HYDROCHLORIDE SCH MG: 50 TABLET ORAL at 22:08

## 2018-09-27 RX ADMIN — Medication SCH MG: at 14:00

## 2018-09-27 RX ADMIN — MIRTAZAPINE SCH MG: 15 TABLET, FILM COATED ORAL at 22:08

## 2018-09-27 RX ADMIN — TRAZODONE HYDROCHLORIDE SCH MG: 50 TABLET ORAL at 22:00

## 2018-09-27 RX ADMIN — Medication SCH MG: at 09:00

## 2018-09-27 RX ADMIN — Medication SCH MG: at 16:18

## 2018-09-27 RX ADMIN — QUETIAPINE FUMARATE SCH MG: 100 TABLET ORAL at 22:07

## 2018-09-27 RX ADMIN — DEXTROSE MONOHYDRATE SCH MLS/HR: 50 INJECTION, SOLUTION INTRAVENOUS at 02:41

## 2018-09-27 RX ADMIN — ENOXAPARIN SODIUM SCH MG: 40 INJECTION SUBCUTANEOUS at 22:08

## 2018-09-27 RX ADMIN — PIPERACILLIN SODIUM AND TAZOBACTAM SODIUM SCH MLS/HR: .375; 3 INJECTION, POWDER, LYOPHILIZED, FOR SOLUTION INTRAVENOUS at 02:03

## 2018-09-27 RX ADMIN — OXYCODONE HYDROCHLORIDE AND ACETAMINOPHEN SCH MG: 500 TABLET ORAL at 16:18

## 2018-09-27 RX ADMIN — LACTOBACILLUS TAB SCH EACH: TAB at 09:00

## 2018-09-27 RX ADMIN — ZINC OXIDE SCH GM: 200 OINTMENT TOPICAL at 09:00

## 2018-09-27 RX ADMIN — ACETYLCYSTEINE SCH MG: 100 INHALANT RESPIRATORY (INHALATION) at 23:53

## 2018-09-27 RX ADMIN — Medication PRN EACH: at 23:05

## 2018-09-27 RX ADMIN — PIPERACILLIN SODIUM AND TAZOBACTAM SODIUM SCH MLS/HR: .375; 3 INJECTION, POWDER, LYOPHILIZED, FOR SOLUTION INTRAVENOUS at 08:32

## 2018-09-27 RX ADMIN — LORAZEPAM PRN MG: 1 TABLET ORAL at 19:36

## 2018-09-27 RX ADMIN — Medication SCH MG: at 19:51

## 2018-09-27 RX ADMIN — SODIUM CHLORIDE, SODIUM LACTATE, POTASSIUM CHLORIDE, AND CALCIUM CHLORIDE PRN MLS/HR: .6; .31; .03; .02 INJECTION, SOLUTION INTRAVENOUS at 22:46

## 2018-09-27 RX ADMIN — Medication SCH ML: at 16:19

## 2018-09-27 RX ADMIN — ALBUTEROL SULFATE SCH MG: 2.5 SOLUTION RESPIRATORY (INHALATION) at 14:00

## 2018-09-27 RX ADMIN — ACETYLCYSTEINE SCH MG: 100 INHALANT RESPIRATORY (INHALATION) at 15:29

## 2018-09-27 RX ADMIN — PIPERACILLIN SODIUM AND TAZOBACTAM SODIUM SCH MLS/HR: .375; 3 INJECTION, POWDER, LYOPHILIZED, FOR SOLUTION INTRAVENOUS at 17:19

## 2018-09-27 RX ADMIN — OXYCODONE HYDROCHLORIDE AND ACETAMINOPHEN SCH MG: 500 TABLET ORAL at 09:00

## 2018-09-27 RX ADMIN — DEXTROSE MONOHYDRATE SCH MLS/HR: 50 INJECTION, SOLUTION INTRAVENOUS at 18:00

## 2018-09-27 RX ADMIN — PIPERACILLIN SODIUM AND TAZOBACTAM SODIUM SCH MLS/HR: .375; 3 INJECTION, POWDER, LYOPHILIZED, FOR SOLUTION INTRAVENOUS at 11:35

## 2018-09-27 RX ADMIN — DEXTROSE MONOHYDRATE SCH MLS/HR: 50 INJECTION, SOLUTION INTRAVENOUS at 11:34

## 2018-09-27 RX ADMIN — ALBUTEROL SULFATE SCH MG: 2.5 SOLUTION RESPIRATORY (INHALATION) at 19:52

## 2018-09-27 RX ADMIN — MIRTAZAPINE SCH MG: 15 TABLET, FILM COATED ORAL at 22:00

## 2018-09-27 RX ADMIN — LACTOBACILLUS TAB SCH EACH: TAB at 16:18

## 2018-09-27 RX ADMIN — TRAMADOL HYDROCHLORIDE PRN MG: 50 TABLET, FILM COATED ORAL at 07:37

## 2018-09-27 NOTE — NUR
TELE RN NOTES

FREQUENT CALLER FOR RT,VERY NEEDY.IV ABX TOLERATED WELL.MAIN IVF KCL IN PROGRESS AT 75ML/HR 
RATE VIA IV PUMP.REPOSITION PER PROTOCOL.MEPILEX APPLIED TO PRESSURE POINTS.HEEL PROTECTOR 
IN USED FOR SKIN MANAGEMENT.[employer] MATTRESS APPLIED FOR SKIN MANAGEMENT.NEEDS ATTENDED. WILL 
ENDORSE TO DAY NURSE FOR RENATA.

## 2018-09-27 NOTE — NUR
received pt from [facility] s/p low saturation,  alert, follows commands, quadriplegic, SR, on 
the vent, 60% fio2 and peep of 8, lung congested, able to swallow meds, v/s stable, no pain, 
pt turned and repositioned, family at the bedside.

## 2018-09-27 NOTE — NUR
PT TRANSFERRED TO ICU VIA AMBUBAG @ 15LPM. PT PLACED ON VENTILATOR PLUGGED INTO RED OUTLET. 
ALARMS SET AND AUDIBLE THROUGH OUT UNIT. SPARE EMIL AND NATHAN AT HOB. DR BEE VERBAL 
ORDER TO INCREASE PEEP +8 FIO2 60%, POST ABG. JOVON HARPER NOTIFIED. WILL CONT TO MONITOR

-------------------------------------------------------------------------------

Addendum: 09/27/18 at 1055 by AVINASH SWAIN RT

-------------------------------------------------------------------------------

Amended: Links added.

## 2018-09-27 NOTE — NUR
TELE RN NOTES

CALL LIGHT KEEPS ON BEEPING Q 2 MINUTES.CHECK ON HIM AND HE'S SLEEPING.ALL DUE MEDS GIVEN 
EARLIER(SEROQUEL 100MG,REMERON 15MG PO AND TRAZODONE 25 MG)

## 2018-09-27 NOTE — NUR
TELE RN. PT RECEIVED A&0X3, TRACH AND VENT DEPENDENT. RT AT BEDSIDE RELATED TO CONSISTENT PT 
NEEDS DURING PM SHIFT. VENT SETTINGS REVIEWED WITH RT, VENT TO RED PLUGS, BAG AT BEDSIDE. 
PMV REMOVED DUE TO POOR TOLERANCE. PT REPORTING PAIN AND REQUESTING ANALGESIA. PT REPORTING 
ANX WILL REQUEST PRN. PT WITH IVC L AC WITH IVF, PAUSED, WILL RE COMMENCE. IV AB TIME'S OUT, 
NEXT ZOSYN TO BE ADMIN 0800, WILL CALL PHARMACY TO RESCHEDULE FOLLOWING ORDERS. PT BED 
LOCKED WITH HANDRAILSX4 AND BLOW-PIPE CALL BELL WITHIN REACH. PT BRIEFED ON TODAY'S POC.

## 2018-09-27 NOTE — NUR
TELE RN NOTES

WENT INSIDE THE ROOM TO DES PRIETO.PATIENT CLAIMED HE'S CHOKING,OFFERED SUCTION BUT 
REFUSED,CLAIMED HE WANTS RT.RT WAS PAGE RIGHT AWAY.STAYED 45 MINUTES TO ATTEND HIS NEEDS.

## 2018-09-27 NOTE — NUR
pt is resting in the bed, v/s stable, no pain, pt cleaned, changed and repositioned q2hrs, 
family at the bedside.

## 2018-09-27 NOTE — NUR
RN NOTES



RECEIVED PT ASLEEP ON BED IN COMFORTABLE POSITION. NO ACUTE RESP DISTRESS WITH TRACH  SHILEY 
8  CONNECTED TO VENT SETTING  AC 16  FIO2 60% FIO2 60% PEEP 8 SATURATION 100%. PT IS 
AOX 3 ABLE TO MAKE KNOWN NEEDS. MOUTH WORDS.  SR ONT MONISHA MONITOR.  IV SITE ON LAC G 18 WITH 
NS + 20 MEQ KCL AT 75 ML/HR INTACT AND PATENT.  F/C DRAINED VIA GRAVITY. NPO EXCEPT MEDS  
WITH INSTRUCTION TO DEFLATE THE CUFF  WHEN TAKING  MEDICINE.  KEPT PT COMFORTBALE IN HIS 
POSITION. CLEANED AND DRY. WILL CONTINUE TO MONITOR

## 2018-09-28 VITALS — SYSTOLIC BLOOD PRESSURE: 125 MMHG | DIASTOLIC BLOOD PRESSURE: 71 MMHG

## 2018-09-28 VITALS — DIASTOLIC BLOOD PRESSURE: 68 MMHG | SYSTOLIC BLOOD PRESSURE: 116 MMHG

## 2018-09-28 VITALS — SYSTOLIC BLOOD PRESSURE: 131 MMHG | DIASTOLIC BLOOD PRESSURE: 78 MMHG

## 2018-09-28 VITALS — SYSTOLIC BLOOD PRESSURE: 121 MMHG | DIASTOLIC BLOOD PRESSURE: 66 MMHG

## 2018-09-28 VITALS — SYSTOLIC BLOOD PRESSURE: 105 MMHG | DIASTOLIC BLOOD PRESSURE: 57 MMHG

## 2018-09-28 VITALS — SYSTOLIC BLOOD PRESSURE: 118 MMHG | DIASTOLIC BLOOD PRESSURE: 65 MMHG

## 2018-09-28 VITALS — DIASTOLIC BLOOD PRESSURE: 61 MMHG | SYSTOLIC BLOOD PRESSURE: 129 MMHG

## 2018-09-28 VITALS — DIASTOLIC BLOOD PRESSURE: 72 MMHG | SYSTOLIC BLOOD PRESSURE: 117 MMHG

## 2018-09-28 VITALS — SYSTOLIC BLOOD PRESSURE: 122 MMHG | DIASTOLIC BLOOD PRESSURE: 75 MMHG

## 2018-09-28 VITALS — SYSTOLIC BLOOD PRESSURE: 121 MMHG | DIASTOLIC BLOOD PRESSURE: 69 MMHG

## 2018-09-28 VITALS — SYSTOLIC BLOOD PRESSURE: 115 MMHG | DIASTOLIC BLOOD PRESSURE: 72 MMHG

## 2018-09-28 VITALS — SYSTOLIC BLOOD PRESSURE: 103 MMHG | DIASTOLIC BLOOD PRESSURE: 60 MMHG

## 2018-09-28 VITALS — SYSTOLIC BLOOD PRESSURE: 126 MMHG | DIASTOLIC BLOOD PRESSURE: 72 MMHG

## 2018-09-28 VITALS — SYSTOLIC BLOOD PRESSURE: 142 MMHG | DIASTOLIC BLOOD PRESSURE: 72 MMHG

## 2018-09-28 VITALS — SYSTOLIC BLOOD PRESSURE: 135 MMHG | DIASTOLIC BLOOD PRESSURE: 55 MMHG

## 2018-09-28 VITALS — DIASTOLIC BLOOD PRESSURE: 78 MMHG | SYSTOLIC BLOOD PRESSURE: 129 MMHG

## 2018-09-28 VITALS — DIASTOLIC BLOOD PRESSURE: 72 MMHG | SYSTOLIC BLOOD PRESSURE: 126 MMHG

## 2018-09-28 VITALS — SYSTOLIC BLOOD PRESSURE: 132 MMHG | DIASTOLIC BLOOD PRESSURE: 74 MMHG

## 2018-09-28 VITALS — DIASTOLIC BLOOD PRESSURE: 71 MMHG | SYSTOLIC BLOOD PRESSURE: 126 MMHG

## 2018-09-28 VITALS — SYSTOLIC BLOOD PRESSURE: 123 MMHG | DIASTOLIC BLOOD PRESSURE: 67 MMHG

## 2018-09-28 VITALS — DIASTOLIC BLOOD PRESSURE: 73 MMHG | SYSTOLIC BLOOD PRESSURE: 128 MMHG

## 2018-09-28 VITALS — SYSTOLIC BLOOD PRESSURE: 91 MMHG | DIASTOLIC BLOOD PRESSURE: 47 MMHG

## 2018-09-28 LAB
BUN SERPL-MCNC: 5 MG/DL (ref 7–18)
CALCIUM SERPL-MCNC: 8.6 MG/DL (ref 8.5–10.1)
CHLORIDE SERPL-SCNC: 104 MMOL/L (ref 98–107)
CO2 SERPL-SCNC: 30 MMOL/L (ref 21–32)
CREAT SERPL-MCNC: 0.3 MG/DL (ref 0.6–1.3)
GLUCOSE SERPL-MCNC: 142 MG/DL (ref 74–106)
POTASSIUM SERPL-SCNC: 3.4 MMOL/L (ref 3.5–5.1)
SODIUM SERPL-SCNC: 139 MMOL/L (ref 136–145)

## 2018-09-28 RX ADMIN — ALBUTEROL SULFATE SCH MG: 2.5 SOLUTION RESPIRATORY (INHALATION) at 08:37

## 2018-09-28 RX ADMIN — PIPERACILLIN SODIUM AND TAZOBACTAM SODIUM SCH MLS/HR: .375; 3 INJECTION, POWDER, LYOPHILIZED, FOR SOLUTION INTRAVENOUS at 05:35

## 2018-09-28 RX ADMIN — ALPRAZOLAM PRN MG: 0.25 TABLET ORAL at 18:13

## 2018-09-28 RX ADMIN — SODIUM CHLORIDE, SODIUM LACTATE, POTASSIUM CHLORIDE, AND CALCIUM CHLORIDE PRN MLS/HR: .6; .31; .03; .02 INJECTION, SOLUTION INTRAVENOUS at 13:09

## 2018-09-28 RX ADMIN — Medication SCH MG: at 19:46

## 2018-09-28 RX ADMIN — OXYCODONE HYDROCHLORIDE AND ACETAMINOPHEN SCH MG: 500 TABLET ORAL at 08:42

## 2018-09-28 RX ADMIN — ENOXAPARIN SODIUM SCH MG: 40 INJECTION SUBCUTANEOUS at 21:06

## 2018-09-28 RX ADMIN — ACETYLCYSTEINE SCH MG: 100 INHALANT RESPIRATORY (INHALATION) at 07:35

## 2018-09-28 RX ADMIN — ACETYLCYSTEINE SCH MG: 100 INHALANT RESPIRATORY (INHALATION) at 15:06

## 2018-09-28 RX ADMIN — LACTOBACILLUS TAB SCH EACH: TAB at 16:20

## 2018-09-28 RX ADMIN — PIPERACILLIN SODIUM AND TAZOBACTAM SODIUM SCH MLS/HR: .375; 3 INJECTION, POWDER, LYOPHILIZED, FOR SOLUTION INTRAVENOUS at 00:23

## 2018-09-28 RX ADMIN — PIPERACILLIN SODIUM AND TAZOBACTAM SODIUM SCH MLS/HR: .375; 3 INJECTION, POWDER, LYOPHILIZED, FOR SOLUTION INTRAVENOUS at 17:25

## 2018-09-28 RX ADMIN — TRAZODONE HYDROCHLORIDE SCH MG: 50 TABLET ORAL at 21:05

## 2018-09-28 RX ADMIN — QUETIAPINE FUMARATE SCH MG: 100 TABLET ORAL at 21:05

## 2018-09-28 RX ADMIN — ALBUTEROL SULFATE SCH MG: 2.5 SOLUTION RESPIRATORY (INHALATION) at 15:06

## 2018-09-28 RX ADMIN — ALPRAZOLAM PRN MG: 0.25 TABLET ORAL at 23:31

## 2018-09-28 RX ADMIN — ALBUTEROL SULFATE SCH MG: 2.5 SOLUTION RESPIRATORY (INHALATION) at 19:46

## 2018-09-28 RX ADMIN — Medication SCH MG: at 08:42

## 2018-09-28 RX ADMIN — Medication SCH ML: at 16:22

## 2018-09-28 RX ADMIN — Medication SCH MG: at 08:37

## 2018-09-28 RX ADMIN — DEXTROSE MONOHYDRATE SCH MLS/HR: 50 INJECTION, SOLUTION INTRAVENOUS at 10:01

## 2018-09-28 RX ADMIN — LACTOBACILLUS TAB SCH EACH: TAB at 08:42

## 2018-09-28 RX ADMIN — LORAZEPAM PRN MG: 1 TABLET ORAL at 04:39

## 2018-09-28 RX ADMIN — PIPERACILLIN SODIUM AND TAZOBACTAM SODIUM SCH MLS/HR: .375; 3 INJECTION, POWDER, LYOPHILIZED, FOR SOLUTION INTRAVENOUS at 11:19

## 2018-09-28 RX ADMIN — ALBUTEROL SULFATE SCH MG: 2.5 SOLUTION RESPIRATORY (INHALATION) at 01:40

## 2018-09-28 RX ADMIN — Medication SCH MG: at 01:40

## 2018-09-28 RX ADMIN — MIRTAZAPINE SCH MG: 15 TABLET, FILM COATED ORAL at 21:05

## 2018-09-28 RX ADMIN — Medication SCH MG: at 15:06

## 2018-09-28 RX ADMIN — DEXTROSE MONOHYDRATE SCH MLS/HR: 50 INJECTION, SOLUTION INTRAVENOUS at 18:12

## 2018-09-28 RX ADMIN — DEXTROSE MONOHYDRATE SCH MLS/HR: 50 INJECTION, SOLUTION INTRAVENOUS at 03:21

## 2018-09-28 RX ADMIN — Medication SCH MG: at 16:20

## 2018-09-28 RX ADMIN — OXYCODONE HYDROCHLORIDE AND ACETAMINOPHEN SCH MG: 500 TABLET ORAL at 16:20

## 2018-09-28 RX ADMIN — PIPERACILLIN SODIUM AND TAZOBACTAM SODIUM SCH MLS/HR: .375; 3 INJECTION, POWDER, LYOPHILIZED, FOR SOLUTION INTRAVENOUS at 23:31

## 2018-09-28 RX ADMIN — TRAMADOL HYDROCHLORIDE PRN MG: 50 TABLET, FILM COATED ORAL at 12:43

## 2018-09-28 RX ADMIN — TRAMADOL HYDROCHLORIDE PRN MG: 50 TABLET, FILM COATED ORAL at 16:56

## 2018-09-28 RX ADMIN — LORAZEPAM PRN MG: 1 TABLET ORAL at 10:42

## 2018-09-28 RX ADMIN — ZINC OXIDE SCH GM: 200 OINTMENT TOPICAL at 08:42

## 2018-09-28 RX ADMIN — Medication SCH ML: at 08:44

## 2018-09-28 NOTE — NUR
RECEIVED TRACH SARAHI 6 PT ON MECHANICAL VENT WITH NOTED SETTINGS.   PT IS ALERT AND AWAKE . 
BREATHING TX GIVEN PER MD'S ORDER. NO ADVERSE REACTION NOTED. SUCTIONED WITH SMALL AMOUNT OF 
PALE YELLOW SECRETIONS. NO RESPIRATORY DISTRESS NOTED AT THIS TIME. PT TRACH PATENT AND 
SECURE. MLT DONE. AMBU BAG AT BEDSIDE. VENT PLUGGED INTO RED OUTLET. ALARMS ARE ON AND 
AUDIBLE. WILL CONTINUE TO MONITOR.

## 2018-09-28 NOTE — NUR
ICU RN NOTES

RECEIVED PATIENT AOX4 , NOT IN ACUTE DISTRESS , DENIES SOB AND DISCOMFORT AT THIS TIME , 
SPO2 % VIA MECHANICAL VENTILATOR SETTINGS AS ORDERED , TRACH OF SHILEY # 8 IN PLACE 
WITH CUFF INFLATED , SR 85 ON BEDSIDE MONITOR, FC DRIANING WELL VIA GRAVITY WITH CLEAR 
YELLOW URINE , ON KCI MATTRESS , IV OF L AC # 18 PATENT AND INTACT WITH NS WITH 20 MEQ KCL @ 
75ML/HR INFUSING WELL , BILATERAL HEELS OFFLOADED WITH BOOTS , ALL NEEDS ATTENDED , HOB @ 35 
, WILL CONTINUE TO MONITOR

## 2018-09-28 NOTE — NUR
[place] RN OPENING NOTES

REPORT RECEIVED FROM GLENN SIGALA. PATIENT A/A/O X4, ABLE TO MAKE NEEDS KNOWN. BREATHING EVEN & 
UNLABORED W/ TRACH INTACT & TOLERATING VENT SETTINGS AC 16, , FIO2 40%, PEEP 5. ON 
TELE W/ SINUS RHYTHM, HR 73. NO RESPIRATORY DISTRESS NOTED. RIGHT AC IV #18 & LEFT AC IV #18 
INTACT & PATENT W/ DRESSING CDI & IVF NS W/ 20 MEQ KCL INFUSING WELL @ 75 ML/HR. MASON CATH 
DRAINING YELLOW URINE. DENIES ANY PAIN OR DISCOMFORT @ THIS TIME. SAFETY MEASURES IN PLACE 
W/ SIDE RAILS UP & BED LOCKED & IN LOWEST POSITION. HOB ELEVATED TO 30 DEGREES FOR 
ASPIRATION PRECAUTIONS. WILL CONTINUE TO MONITOR.

## 2018-09-28 NOTE — NUR
RT NOTE

TRANSFERRED PT FROM [facility] TO ICU, VIA AMBUBAG, PT WITH STABLE VITAL SIGNS, AMBUBAG AT [facility], 
WILL CONTINUE TO MONITOR CLOSELY

## 2018-09-28 NOTE — NUR
ICU RN NOTES

RECEIVED A CALL FROM DR PETTIT FOR PSYCH CONSULT , DISCUSSED PT HISTORY AND CC, PT HAS 
EPISODES OF ANXIETY AND LOOK DEPRESSED, DISCUSSED THAT DR SAMUELS SIGNED OUT THE CASE DUE 
TO THE NURSE ADVOCATE MANAGING AND SUGGESTING DIFFERENT MEDICATIONS FOR PSYCH ISSUES

## 2018-09-28 NOTE — NUR
ICU RN NOTES

SEEN AND EVALUATED BY CARISSA WOUND RN , TOOK PICTURES OF ESTELITA WOUND AND  PLACED ON THE CHART

## 2018-09-28 NOTE — NUR
ICU RN NOTES

PT IS REQUESTING FOR A PSYCH CONSULT AS PT FEELS DEPRESSED AND ANXIOUS , FAMILY IS REFUSING 
DR LAST'S CARE , CALLED PSYCH DEPARTMENT , SPOKE WITH JB , PER JB SAMUELS 
IS AVAILABLE TODAY , FAXED FACE SHEET AND PLACED AN ORDER FOR PSYCH CONSULT UNDER DR BRANDON 
, DR SALEH IS AWARE .

## 2018-09-28 NOTE — NUR
ICU RN NOTES

CALLED RT TO DEFLATE BALLON AS PT IS ANXIOUS AND REQUESTING FOR ATIVAN PRN , PT TOLERATING 
CUFF DEFLATED WITH NO DISTRESS , RT INFLATED BACK THE BALLOON , WILL CONTINUE TO MONITOR

## 2018-09-28 NOTE — NUR
WOUND CARE CONSULT  WOUND CARE RECEIVED CONSULT FOR BEDBOUND/QUADREPLEGIC.  WOUND CARE WILL 
DEFER CONSULT AND ALL TREATMENT PLANS TO SURGICAL TEAM WHO ARE CURRENTLY FOLLOWING.  PATIENT 
WITH REFUGIO AT 9.  ALL PRESSURE ULCER PREVENTION MEASURES ARE NOTED TO BE IN PLACE.  WILL 
SEE PRN.

## 2018-09-28 NOTE — NUR
ICU RN NOTES

RECEIVED A CALL FROM DR SALEH ,  PER MD DISCONTINUE ATIVAN PO PRN AND CHANGE IT TO XANAX 
0.5MG Q6 PRN , ORDERS CARRIED OUT

## 2018-09-28 NOTE — NUR
RN NOTES



NO SIGNIFICANT CHANGES, TRACH AND VENT SETTING  FIO2 CHANGED TO 45% PEEP 8, TOLERATED WELL.  
 AFEBRILE.  SR ON TELE MONITOR. REFUSED TO HAVE BED BATH ON NIGHT SHIFT. PT IS AOX4  ANXIETY 
NOTED WHEN AWAKE. ENCOURAGED  TO CALM  DOWN AND RELAX. RT AT BED SIDE DUE MEDICINE TOLERATED 
BY MOUTH WITH SIP OF THIN LIQUID.  PRN MEDICINE GIVEN  FOR ANXIETY.  REMAINED EFFECTIVE. ALL 
NEEDS ATTENDED. F/C DRAINED WITH YELLOW CLEAR COLOR URINE. TURNED AND REPOSITIONED  Q2H AND 
AS PT REQUESTED. ENDORSED CONTINUITY OF CARE TO AM NURSE.

## 2018-09-28 NOTE — NUR
ICU RN NOTES

TRANSFERRED PT TO ROOM 107 VIA ACLS PROTOCOL , VSS , AFEBRILE NO SIGNS OF DISTRESS , 
BELONGINGS TRANSFERED BY FRIEND TO ROOM 107 , REPORT GIVE DEJA ELIZABETH , ALL QUESTIONS ANSWERED 
.

## 2018-09-28 NOTE — NUR
ICU RN NOTES

SEEN AND EVALUATED BY DR BEE ,DISCUSSED LABS , CHEST XRAY ,AND LATEST V/S , SPO2 OF 98% 
VIA MECHANICAL VENT SETTING AS ORDERED, FIO2 OF 45% AND PEEP OF 8 , PER MD DECREASE FIO2 TO 
40% TO KEEP SPO2 ABOVE 90-92% AND DECREASE PEEP TO 5 , F PT IS DOING WELL AFTER TWO HOURS PT 
CAN BE DOWNGRADED TO [place] LEVEL OF CARE  , KEEP PT NPO WITH MEDS , DEFLATE BALLOON OF TRACH 
CUFF WHEN GIVING MEDICATIONS AND INFLATE AFTER . ORDERS CARRIED OUT

## 2018-09-28 NOTE — NUR
RT NOTE

CHANGED VENT SETTINGS DUE TO MD WEANING ORDERS, FIO2 OF 40% AND PEEP OF 5, WILL MONITOR 
CLOSELY

## 2018-09-29 VITALS — DIASTOLIC BLOOD PRESSURE: 72 MMHG | SYSTOLIC BLOOD PRESSURE: 145 MMHG

## 2018-09-29 VITALS — DIASTOLIC BLOOD PRESSURE: 73 MMHG | SYSTOLIC BLOOD PRESSURE: 126 MMHG

## 2018-09-29 VITALS — SYSTOLIC BLOOD PRESSURE: 147 MMHG | DIASTOLIC BLOOD PRESSURE: 76 MMHG

## 2018-09-29 VITALS — SYSTOLIC BLOOD PRESSURE: 114 MMHG | DIASTOLIC BLOOD PRESSURE: 54 MMHG

## 2018-09-29 VITALS — SYSTOLIC BLOOD PRESSURE: 130 MMHG | DIASTOLIC BLOOD PRESSURE: 71 MMHG

## 2018-09-29 VITALS — SYSTOLIC BLOOD PRESSURE: 112 MMHG | DIASTOLIC BLOOD PRESSURE: 61 MMHG

## 2018-09-29 LAB
BASOPHILS # BLD AUTO: 0 /CMM (ref 0–0.2)
BASOPHILS NFR BLD AUTO: 0.3 % (ref 0–2)
BUN SERPL-MCNC: 13 MG/DL (ref 7–18)
CALCIUM SERPL-MCNC: 8.9 MG/DL (ref 8.5–10.1)
CHLORIDE SERPL-SCNC: 106 MMOL/L (ref 98–107)
CO2 SERPL-SCNC: 30 MMOL/L (ref 21–32)
CREAT SERPL-MCNC: 0.8 MG/DL (ref 0.6–1.3)
EOSINOPHIL NFR BLD AUTO: 0.4 % (ref 0–6)
GLUCOSE SERPL-MCNC: 135 MG/DL (ref 74–106)
HCT VFR BLD AUTO: 31 % (ref 39–51)
HGB BLD-MCNC: 10 G/DL (ref 13.5–17.5)
LYMPHOCYTES NFR BLD AUTO: 0.8 /CMM (ref 0.8–4.8)
LYMPHOCYTES NFR BLD AUTO: 4.5 % (ref 20–44)
MCHC RBC AUTO-ENTMCNC: 32 G/DL (ref 31–36)
MCV RBC AUTO: 87 FL (ref 80–96)
MONOCYTES NFR BLD AUTO: 1.1 /CMM (ref 0.1–1.3)
MONOCYTES NFR BLD AUTO: 6.6 % (ref 2–12)
NEUTROPHILS # BLD AUTO: 15.2 /CMM (ref 1.8–8.9)
NEUTROPHILS NFR BLD AUTO: 88.2 % (ref 43–81)
PLATELET # BLD AUTO: 415 /CMM (ref 150–450)
POTASSIUM SERPL-SCNC: 4.6 MMOL/L (ref 3.5–5.1)
RBC # BLD AUTO: 3.63 MIL/UL (ref 4.5–6)
RDW COEFFICIENT OF VARIATION: 15.9 (ref 11.5–15)
SODIUM SERPL-SCNC: 141 MMOL/L (ref 136–145)
WBC NRBC COR # BLD AUTO: 17.2 K/UL (ref 4.3–11)

## 2018-09-29 RX ADMIN — QUETIAPINE FUMARATE SCH MG: 100 TABLET ORAL at 21:03

## 2018-09-29 RX ADMIN — ACETYLCYSTEINE SCH MG: 100 INHALANT RESPIRATORY (INHALATION) at 08:01

## 2018-09-29 RX ADMIN — ALBUTEROL SULFATE SCH MG: 2.5 SOLUTION RESPIRATORY (INHALATION) at 00:32

## 2018-09-29 RX ADMIN — PIPERACILLIN SODIUM AND TAZOBACTAM SODIUM SCH MLS/HR: .375; 3 INJECTION, POWDER, LYOPHILIZED, FOR SOLUTION INTRAVENOUS at 23:53

## 2018-09-29 RX ADMIN — ALBUTEROL SULFATE SCH MG: 2.5 SOLUTION RESPIRATORY (INHALATION) at 08:02

## 2018-09-29 RX ADMIN — PIPERACILLIN SODIUM AND TAZOBACTAM SODIUM SCH MLS/HR: .375; 3 INJECTION, POWDER, LYOPHILIZED, FOR SOLUTION INTRAVENOUS at 18:07

## 2018-09-29 RX ADMIN — Medication SCH MG: at 16:40

## 2018-09-29 RX ADMIN — Medication PRN EACH: at 10:08

## 2018-09-29 RX ADMIN — Medication SCH MG: at 19:02

## 2018-09-29 RX ADMIN — TRAMADOL HYDROCHLORIDE PRN MG: 50 TABLET, FILM COATED ORAL at 21:11

## 2018-09-29 RX ADMIN — LACTOBACILLUS TAB SCH EACH: TAB at 16:40

## 2018-09-29 RX ADMIN — Medication SCH MG: at 00:31

## 2018-09-29 RX ADMIN — ACETYLCYSTEINE SCH MG: 100 INHALANT RESPIRATORY (INHALATION) at 00:31

## 2018-09-29 RX ADMIN — TRAZODONE HYDROCHLORIDE SCH MG: 50 TABLET ORAL at 21:04

## 2018-09-29 RX ADMIN — PIPERACILLIN SODIUM AND TAZOBACTAM SODIUM SCH MLS/HR: .375; 3 INJECTION, POWDER, LYOPHILIZED, FOR SOLUTION INTRAVENOUS at 05:45

## 2018-09-29 RX ADMIN — DEXTROSE MONOHYDRATE SCH MLS/HR: 50 INJECTION, SOLUTION INTRAVENOUS at 02:14

## 2018-09-29 RX ADMIN — SODIUM CHLORIDE, SODIUM LACTATE, POTASSIUM CHLORIDE, AND CALCIUM CHLORIDE PRN MLS/HR: .6; .31; .03; .02 INJECTION, SOLUTION INTRAVENOUS at 04:54

## 2018-09-29 RX ADMIN — ALPRAZOLAM PRN MG: 0.25 TABLET ORAL at 05:05

## 2018-09-29 RX ADMIN — Medication SCH MG: at 08:11

## 2018-09-29 RX ADMIN — ALBUTEROL SULFATE SCH MG: 2.5 SOLUTION RESPIRATORY (INHALATION) at 13:52

## 2018-09-29 RX ADMIN — Medication SCH MG: at 08:02

## 2018-09-29 RX ADMIN — ZINC OXIDE SCH GM: 200 OINTMENT TOPICAL at 08:22

## 2018-09-29 RX ADMIN — ALPRAZOLAM PRN MG: 0.25 TABLET ORAL at 12:02

## 2018-09-29 RX ADMIN — Medication SCH MG: at 13:52

## 2018-09-29 RX ADMIN — OXYCODONE HYDROCHLORIDE AND ACETAMINOPHEN SCH MG: 500 TABLET ORAL at 08:11

## 2018-09-29 RX ADMIN — ACETYLCYSTEINE SCH MG: 100 INHALANT RESPIRATORY (INHALATION) at 16:11

## 2018-09-29 RX ADMIN — SODIUM CHLORIDE, SODIUM LACTATE, POTASSIUM CHLORIDE, AND CALCIUM CHLORIDE PRN MLS/HR: .6; .31; .03; .02 INJECTION, SOLUTION INTRAVENOUS at 22:25

## 2018-09-29 RX ADMIN — OXYCODONE HYDROCHLORIDE AND ACETAMINOPHEN SCH MG: 500 TABLET ORAL at 16:40

## 2018-09-29 RX ADMIN — Medication PRN EACH: at 22:22

## 2018-09-29 RX ADMIN — ALBUTEROL SULFATE SCH MG: 2.5 SOLUTION RESPIRATORY (INHALATION) at 19:02

## 2018-09-29 RX ADMIN — PIPERACILLIN SODIUM AND TAZOBACTAM SODIUM SCH MLS/HR: .375; 3 INJECTION, POWDER, LYOPHILIZED, FOR SOLUTION INTRAVENOUS at 11:26

## 2018-09-29 RX ADMIN — LACTOBACILLUS TAB SCH EACH: TAB at 08:11

## 2018-09-29 RX ADMIN — ENOXAPARIN SODIUM SCH MG: 40 INJECTION SUBCUTANEOUS at 21:03

## 2018-09-29 RX ADMIN — ALPRAZOLAM PRN MG: 0.25 TABLET ORAL at 22:23

## 2018-09-29 NOTE — NUR
[place] RN NOTES

SEEN BY  UPDATED ABOUT PATIENT CONDITION.OK TO USE PMV AS TOLERATED.NPO EXCEPT 
MEDS.WAITING FOR SWALLOW EVAL TO FEED THE PATIENT.

## 2018-09-29 NOTE — NUR
[place] RN CLOSING NOTES

PATIENT A/A/O X4, ABLE TO MOUTH WORDS. BREATHING EVEN & UNLABORED W/ TRACH INTACT & 
TOLERATING VENT SETTINGS AS ORDERED. ON TELE W/ SINUS RHYTHM, HR 76. NO RESPIRATORY DISTRESS 
NOTED.RT AT BEDSIDE.FAMILY AT BEDSIDE. RIGHT AC IV #18 & LEFT AC IV #18 INTACT & PATENT W/ 
DRESSING CDI & IVF NS W/ 20 MEQ KCL INFUSING WELL @ 75 ML/HR. MASON CATH DRAINING YELLOW 
URINE. DENIES ANY PAIN OR DISCOMFORT @ THIS TIME. SAFETY MEASURES IN PLACE W/ SIDE RAILS UP 
& BED LOCKED & IN LOWEST POSITION. HOB ELEVATED TO 30 DEGREES FOR ASPIRATION PRECAUTIONS. 
SPOKE TO ELLY NURSE ADVOCATE,WANT TO MAKE  SOME CHANGES IN THE MEDICATION LIST.TOLD HER 
WILL F/U WITH PSYCHIATRY DOCTOR TOMORROW.ENDORSED TO PM SHIFT TO F/U AND RENATA.

## 2018-09-29 NOTE — NUR
[place] RN NOTES

[facility],REQUESTED TO CALL 
ROM[facility].CALL MADE TO ROM[facility]N ,MADE AWARE ABOUT THE ORDER FOR ROUTINE SWALLOW EVAL.HE SAID SOME ONE 
WILL BE COMING TODAY OR TOMORROW MORNING.PATIENT MADE AWARE.

## 2018-09-29 NOTE — NUR
PT ON VENT A/C MODE. PT GILLES WELL WITH NO RESPIRATORY DISTRESS NOTED ATT. PT HAS A TRACH 
WHICH IS INTACT, PATENT, AND SECURE. PT BREATH SOUNDS COURSE SX PRN RETURNING SMALL 
WHITE/PALE YELLOW SECRETIONS. VENT ALARMS CHECKED FOUND TO BE FUNCTIONAL, AUDIBLE, AND 
WITHIN LIMITS. TX AS MD ORDERED. VENT PLUGGED INTO RED OUTLET. BVM AT BEDSIDE.

## 2018-09-29 NOTE — NUR
RN OPENING NOTES



RECEIVED PATIENT, A/A/O X4, ABLE TO MOUTH WORDS. BREATHING EVEN & UNLABORED W/ TRACH INTACT 
& TOLERATING VENT SETTINGS AS ORDERED. ON TELE W/ SINUS RHYTHM, HR 82. NO RESPIRATORY 
DISTRESS NOTED. RIGHT AC IV #18 & LEFT AC IV #18 INTACT & PATENT W/ DRESSING CDI & IVF NS W/ 
20 MEQ KCL INFUSING WELL @ 75 ML/HR. MASON CATH DRAINING YELLOW URINE.  SAFETY MEASURES IN 
PLACE W/ SIDE RAILS UP & BED LOCKED & IN LOWEST POSITION. HOB ELEVATED TO 30 DEGREES FOR 
ASPIRATION PRECAUTIONS. WILL CONTINUE TO MONITOR.

## 2018-09-29 NOTE — NUR
[place] RN NOTES

FAMILY BROUGHT FOOD.ASKED DR.SHAO NEGRON OK TO GIVE FOOD FROM FAMILY BRINGS.OK TO GIVE 
IT.PATIENT MADE AWARE.

## 2018-09-29 NOTE — NUR
[place] RN NOTES

SEEN BY KATE GALLEGOS MD SPOKE TO THE PATIENT.GOT NEW ORDERS WILL CONTINUE TO 
MONITOR.

## 2018-09-29 NOTE — NUR
[place] RN NOTES

SEEN BY  .UPDATED ABOUT PATIENT CONDITION.MADE AWARE ABOUT LABS.GOT NEW ORDERS.GOT 
CALL FROM [facility].UPDATED ABOUT PATIENT CONDITION AS PER REQUEST.

## 2018-09-29 NOTE — NUR
[place] RN NOTES

SEEN BY SPEECH THERAPIST JACKSON .GOT NEW ORDERS.FEEDING INSTRUCTIONS,CALL RT DEFLATE CUFF 
,USE PMV. SIT UP 90 DEGREE, SWALLOW TWICE AFTER EACH BITE, BREAKS IN BETWEEN, KEEP HOB 
ELEVATED 30MIN AFTER FEEDING, SPEECH F/U 5X/WEEK.TOLD THAT SHE WILL TALK TO ROM[place]N AND WILL 
MAKE THEM AWARE ABOUT RECOMMENDATION FOR VIDEO SWALLOW STUDY /REASSESS ON MONDAY WITH 
DYE.INSTRUCTIONS PUT ON THE BED SIDE.WILL ENDORSE TO PM NURSE.

## 2018-09-29 NOTE — NUR
[place] RN OPENING NOTES

REPORT RECEIVED FROM PM RN. PATIENT A/A/O X4, ABLE TO MOUTH WORDS. BREATHING EVEN & 
UNLABORED W/ TRACH INTACT & TOLERATING VENT SETTINGS AS ORDERED. ON TELE W/ SINUS RHYTHM, HR 
78. NO RESPIRATORY DISTRESS NOTED. RIGHT AC IV #18 & LEFT AC IV #18 INTACT & PATENT W/ 
DRESSING CDI & IVF NS W/ 20 MEQ KCL INFUSING WELL @ 75 ML/HR. MASON CATH DRAINING YELLOW 
URINE. DENIES ANY PAIN OR DISCOMFORT @ THIS TIME. SAFETY MEASURES IN PLACE W/ SIDE RAILS UP 
& BED LOCKED & IN LOWEST POSITION. HOB ELEVATED TO 30 DEGREES FOR ASPIRATION PRECAUTIONS. 
WILL CONTINUE TO MONITOR.

## 2018-09-30 VITALS — SYSTOLIC BLOOD PRESSURE: 109 MMHG | DIASTOLIC BLOOD PRESSURE: 61 MMHG

## 2018-09-30 VITALS — DIASTOLIC BLOOD PRESSURE: 86 MMHG | SYSTOLIC BLOOD PRESSURE: 151 MMHG

## 2018-09-30 VITALS — DIASTOLIC BLOOD PRESSURE: 66 MMHG | SYSTOLIC BLOOD PRESSURE: 109 MMHG

## 2018-09-30 LAB
BASOPHILS # BLD AUTO: 0 /CMM (ref 0–0.2)
BASOPHILS NFR BLD AUTO: 0.3 % (ref 0–2)
BUN SERPL-MCNC: 13 MG/DL (ref 7–18)
CALCIUM SERPL-MCNC: 8.9 MG/DL (ref 8.5–10.1)
CHLORIDE SERPL-SCNC: 107 MMOL/L (ref 98–107)
CO2 SERPL-SCNC: 30 MMOL/L (ref 21–32)
CREAT SERPL-MCNC: 0.9 MG/DL (ref 0.6–1.3)
EOSINOPHIL NFR BLD AUTO: 1.2 % (ref 0–6)
GLUCOSE SERPL-MCNC: 90 MG/DL (ref 74–106)
HCT VFR BLD AUTO: 31 % (ref 39–51)
HGB BLD-MCNC: 9.8 G/DL (ref 13.5–17.5)
LYMPHOCYTES NFR BLD AUTO: 1.1 /CMM (ref 0.8–4.8)
LYMPHOCYTES NFR BLD AUTO: 8.1 % (ref 20–44)
MCHC RBC AUTO-ENTMCNC: 32 G/DL (ref 31–36)
MCV RBC AUTO: 86 FL (ref 80–96)
MONOCYTES NFR BLD AUTO: 0.8 /CMM (ref 0.1–1.3)
MONOCYTES NFR BLD AUTO: 6 % (ref 2–12)
NEUTROPHILS # BLD AUTO: 11.6 /CMM (ref 1.8–8.9)
NEUTROPHILS NFR BLD AUTO: 84.4 % (ref 43–81)
PLATELET # BLD AUTO: 455 /CMM (ref 150–450)
POTASSIUM SERPL-SCNC: 4.3 MMOL/L (ref 3.5–5.1)
RBC # BLD AUTO: 3.56 MIL/UL (ref 4.5–6)
RDW COEFFICIENT OF VARIATION: 15.8 (ref 11.5–15)
SODIUM SERPL-SCNC: 143 MMOL/L (ref 136–145)
WBC NRBC COR # BLD AUTO: 13.7 K/UL (ref 4.3–11)

## 2018-09-30 RX ADMIN — Medication SCH MG: at 08:24

## 2018-09-30 RX ADMIN — ALBUTEROL SULFATE SCH MG: 2.5 SOLUTION RESPIRATORY (INHALATION) at 13:16

## 2018-09-30 RX ADMIN — ACETYLCYSTEINE SCH MG: 100 INHALANT RESPIRATORY (INHALATION) at 15:30

## 2018-09-30 RX ADMIN — PIPERACILLIN SODIUM AND TAZOBACTAM SODIUM SCH MLS/HR: .375; 3 INJECTION, POWDER, LYOPHILIZED, FOR SOLUTION INTRAVENOUS at 05:55

## 2018-09-30 RX ADMIN — ZINC OXIDE SCH GM: 200 OINTMENT TOPICAL at 08:20

## 2018-09-30 RX ADMIN — ACETYLCYSTEINE SCH MG: 100 INHALANT RESPIRATORY (INHALATION) at 07:35

## 2018-09-30 RX ADMIN — Medication SCH MG: at 13:16

## 2018-09-30 RX ADMIN — ALBUTEROL SULFATE SCH MG: 2.5 SOLUTION RESPIRATORY (INHALATION) at 01:44

## 2018-09-30 RX ADMIN — TRAMADOL HYDROCHLORIDE PRN MG: 50 TABLET, FILM COATED ORAL at 05:54

## 2018-09-30 RX ADMIN — LACTOBACILLUS TAB SCH EACH: TAB at 08:19

## 2018-09-30 RX ADMIN — TRAMADOL HYDROCHLORIDE PRN MG: 50 TABLET, FILM COATED ORAL at 09:58

## 2018-09-30 RX ADMIN — Medication SCH MG: at 08:18

## 2018-09-30 RX ADMIN — OXYCODONE HYDROCHLORIDE AND ACETAMINOPHEN SCH MG: 500 TABLET ORAL at 08:18

## 2018-09-30 RX ADMIN — ACETYLCYSTEINE SCH MG: 100 INHALANT RESPIRATORY (INHALATION) at 00:10

## 2018-09-30 RX ADMIN — Medication SCH MG: at 01:44

## 2018-09-30 RX ADMIN — ALPRAZOLAM PRN MG: 0.25 TABLET ORAL at 15:16

## 2018-09-30 RX ADMIN — ALBUTEROL SULFATE SCH MG: 2.5 SOLUTION RESPIRATORY (INHALATION) at 08:24

## 2018-09-30 RX ADMIN — PIPERACILLIN SODIUM AND TAZOBACTAM SODIUM SCH MLS/HR: .375; 3 INJECTION, POWDER, LYOPHILIZED, FOR SOLUTION INTRAVENOUS at 12:21

## 2018-09-30 NOTE — NUR
RN CLOSING NOTES



NO SIGNIFICANT CHANGE IN PTS CONDITION OVER SHIFT. ALL NEEDS ATTENDED. ALL DUE MEDS GIVEN. 
WILL ENDORSE TO AM RN.

## 2018-09-30 NOTE — NUR
RN NOTE



Kept PIV p-er SN[facility] RN requesty and Navarro cath intact, emptied 950 mL elmo colored urine.

## 2018-09-30 NOTE — NUR
RN NOTE



Report given to Nat SIGALA in [facility], [facility] personnels here for PU. Report given. Faxed med 
list to [facility] as requested.

## 2018-09-30 NOTE — NUR
RN NOTE



0720: Received patient awake, A/Ox4. With trache to vent, tolerated settings well. PIV 
intact, IVF infusing as ordered. Navarro intact, noted with elmo colored urine drained to 
BSD.



0830: Placed on PMV by RT, eating breakfast, tolerated diet well. Meds given with the meal. 
No respiratory distress noted. Will hold Vanco dose for trough 21.



0910: RT removed PMV as requested by patient, said wants to rest from PMV.



0940: RT placed back PMV as requested by patient.



1100: S/E by Dr. Anaya, girlfriend at bedside, discussed re: the POC. MD ordered DC back to 
facility, called , said to be picked up by 1430.CN aware. Called Martha RN friend and 
given update. 



1110: Girlfriend said patient had episode of verbalizing patient wants to end his life by 
removing vent machine if he could. Girlfriend wants me to inform pshyche, called Dr. Yoo's number, no answer, left message re: girlfriend's concern of suicidal ideation and 
Martha RN friend to change psyche meds. Awaiting for call back.

## 2018-09-30 NOTE — NUR
RN NOTE



Paged Dr. Yoo x4 re: psyche meds and family and friend's concerns. Still waiting for 
call back.

## 2018-09-30 NOTE — NUR
PT REC'D ON VENT, AWAKE AND ALERT, ON AC MODE. PT REQUESTING PMV TO EAT. PT PLACED ON PMV, 
CUFF PARTIALLY DEFLATED. ALARMS ADJUSTED FOR PATIENT SAFETY PER HOSPITAL POLICY. CNA AT 
BEDSIDE FEEDING PT. PT'S FRIEND LIV AT BEDSIDE AS WELL. PT IS ABLE TO SPEAK WELL WITH NO 
SOB NOTED. WILL CONTINUE TO MONITOR PT. VENT PLUGGED INTO RED OUTLET. AMBU BAG AT HOB. 

-------------------------------------------------------------------------------

Addendum: 09/30/18 at 0803 by ATIYA NÚÑEZ RT

-------------------------------------------------------------------------------

Amended: Links added.

## 2018-09-30 NOTE — NUR
RN NOTE



Spoke with Dr. Yoo, with order to DC Seroquel but he does not want to start patient on 
restoril.

## 2018-10-25 NOTE — NUR
RECEIVED ORDERS FROM DR SALEH, 

BISACODYL 10 MG RC SUPPOSITORY DAILY PRN

SEROQUEL BID PO 50 MG 

SEROQUEL 12.5 MG PO Q 8 HOURS PRN spouse

## 2023-03-31 NOTE — NUR
INCREASED FI02 TO 50% AT THIS TIME TO KEEP SP02 ABOVE 94%. WAS ABLE TO SLIGHTLY INFLATE 
CUFF. PT TOLD ME TO STOP ADDING MORE AIR TO CUFF. RN NOTIFED AT THIS TIME. Male
